# Patient Record
Sex: MALE | Race: ASIAN | NOT HISPANIC OR LATINO
[De-identification: names, ages, dates, MRNs, and addresses within clinical notes are randomized per-mention and may not be internally consistent; named-entity substitution may affect disease eponyms.]

---

## 2022-05-20 ENCOUNTER — APPOINTMENT (OUTPATIENT)
Dept: UROLOGY | Facility: CLINIC | Age: 87
End: 2022-05-20
Payer: MEDICARE

## 2022-05-20 VITALS
OXYGEN SATURATION: 96 % | TEMPERATURE: 97.7 F | HEIGHT: 64 IN | SYSTOLIC BLOOD PRESSURE: 106 MMHG | DIASTOLIC BLOOD PRESSURE: 70 MMHG | BODY MASS INDEX: 28.17 KG/M2 | WEIGHT: 165 LBS | HEART RATE: 93 BPM

## 2022-05-20 DIAGNOSIS — Z78.9 OTHER SPECIFIED HEALTH STATUS: ICD-10-CM

## 2022-05-20 PROCEDURE — 51798 US URINE CAPACITY MEASURE: CPT

## 2022-05-20 PROCEDURE — 51700 IRRIGATION OF BLADDER: CPT

## 2022-05-20 PROCEDURE — 99214 OFFICE O/P EST MOD 30 MIN: CPT | Mod: 25

## 2022-05-20 PROCEDURE — A4218: CPT | Mod: NC

## 2022-05-20 RX ORDER — ERGOCALCIFEROL 1.25 MG/1
1.25 MG CAPSULE, LIQUID FILLED ORAL
Qty: 12 | Refills: 0 | Status: ACTIVE | COMMUNITY
Start: 2022-02-17

## 2022-05-20 RX ORDER — QUETIAPINE FUMARATE 100 MG/1
100 TABLET ORAL
Qty: 30 | Refills: 0 | Status: ACTIVE | COMMUNITY
Start: 2022-03-04

## 2022-05-24 ENCOUNTER — APPOINTMENT (OUTPATIENT)
Dept: UROLOGY | Facility: CLINIC | Age: 87
End: 2022-05-24
Payer: MEDICARE

## 2022-05-24 ENCOUNTER — NON-APPOINTMENT (OUTPATIENT)
Age: 87
End: 2022-05-24

## 2022-05-24 ENCOUNTER — APPOINTMENT (OUTPATIENT)
Dept: CARDIOLOGY | Facility: CLINIC | Age: 87
End: 2022-05-24
Payer: MEDICARE

## 2022-05-24 VITALS
SYSTOLIC BLOOD PRESSURE: 103 MMHG | OXYGEN SATURATION: 94 % | BODY MASS INDEX: 28.84 KG/M2 | HEART RATE: 63 BPM | TEMPERATURE: 97.4 F | WEIGHT: 168 LBS | RESPIRATION RATE: 16 BRPM | DIASTOLIC BLOOD PRESSURE: 69 MMHG

## 2022-05-24 DIAGNOSIS — R33.9 RETENTION OF URINE, UNSPECIFIED: ICD-10-CM

## 2022-05-24 DIAGNOSIS — Z87.11 PERSONAL HISTORY OF PEPTIC ULCER DISEASE: ICD-10-CM

## 2022-05-24 PROCEDURE — 51798 US URINE CAPACITY MEASURE: CPT

## 2022-05-24 PROCEDURE — 93000 ELECTROCARDIOGRAM COMPLETE: CPT | Mod: 59

## 2022-05-24 PROCEDURE — 51700 IRRIGATION OF BLADDER: CPT

## 2022-05-24 PROCEDURE — A4218: CPT | Mod: NC

## 2022-05-24 PROCEDURE — 99213 OFFICE O/P EST LOW 20 MIN: CPT | Mod: 25

## 2022-05-24 PROCEDURE — 99204 OFFICE O/P NEW MOD 45 MIN: CPT | Mod: 25

## 2022-05-24 RX ORDER — CIPROFLOXACIN HYDROCHLORIDE 500 MG/1
500 TABLET, FILM COATED ORAL
Refills: 0 | Status: COMPLETED | OUTPATIENT
Start: 2022-05-24

## 2022-05-24 RX ORDER — AZITHROMYCIN 250 MG/1
250 TABLET, FILM COATED ORAL
Qty: 6 | Refills: 0 | Status: DISCONTINUED | COMMUNITY
Start: 2022-05-05 | End: 2022-05-24

## 2022-05-24 RX ORDER — OMEPRAZOLE 40 MG/1
40 CAPSULE, DELAYED RELEASE ORAL
Qty: 30 | Refills: 0 | Status: DISCONTINUED | COMMUNITY
Start: 2022-03-15 | End: 2022-05-24

## 2022-05-24 RX ORDER — APIXABAN 2.5 MG/1
2.5 TABLET, FILM COATED ORAL
Qty: 60 | Refills: 2 | Status: ACTIVE | COMMUNITY
Start: 2022-05-24 | End: 1900-01-01

## 2022-05-24 RX ORDER — DEXTROMETHORPHAN HBR, GUAIFENESIN 20; 200 MG/10ML; MG/10ML
10-100 SOLUTION ORAL
Qty: 237 | Refills: 0 | Status: DISCONTINUED | COMMUNITY
Start: 2021-12-27 | End: 2022-05-24

## 2022-05-24 RX ORDER — CICLOPIROX OLAMINE 7.7 MG/ML
0.77 SUSPENSION TOPICAL
Qty: 30 | Refills: 0 | Status: DISCONTINUED | COMMUNITY
Start: 2021-09-09 | End: 2022-05-24

## 2022-05-24 RX ORDER — CELECOXIB 200 MG/1
200 CAPSULE ORAL
Qty: 30 | Refills: 0 | Status: DISCONTINUED | COMMUNITY
Start: 2022-02-17 | End: 2022-05-24

## 2022-05-24 RX ORDER — BENZONATATE 100 MG/1
100 CAPSULE ORAL
Qty: 60 | Refills: 0 | Status: DISCONTINUED | COMMUNITY
Start: 2022-02-17 | End: 2022-05-24

## 2022-05-24 RX ORDER — AMMONIUM LACTATE 12 %
12 CREAM (GRAM) TOPICAL
Qty: 385 | Refills: 0 | Status: DISCONTINUED | COMMUNITY
Start: 2021-12-03 | End: 2022-05-24

## 2022-05-24 RX ORDER — DONEPEZIL HYDROCHLORIDE 5 MG/1
5 TABLET ORAL
Qty: 30 | Refills: 0 | Status: DISCONTINUED | COMMUNITY
Start: 2022-05-03 | End: 2022-05-24

## 2022-05-24 RX ORDER — ESCITALOPRAM OXALATE 10 MG/1
10 TABLET ORAL
Qty: 30 | Refills: 0 | Status: DISCONTINUED | COMMUNITY
Start: 2022-05-06 | End: 2022-05-24

## 2022-05-24 RX ORDER — VITAMIN B COMPLEX
CAPSULE ORAL
Qty: 60 | Refills: 0 | Status: DISCONTINUED | COMMUNITY
Start: 2022-02-17 | End: 2022-05-24

## 2022-05-24 RX ORDER — ESCITALOPRAM OXALATE 5 MG/1
5 TABLET ORAL
Qty: 30 | Refills: 0 | Status: DISCONTINUED | COMMUNITY
Start: 2022-03-04 | End: 2022-05-24

## 2022-05-24 RX ORDER — APIXABAN 5 MG/1
5 TABLET, FILM COATED ORAL
Qty: 60 | Refills: 0 | Status: DISCONTINUED | COMMUNITY
Start: 2022-05-12 | End: 2022-05-24

## 2022-05-24 RX ORDER — LOSARTAN POTASSIUM 100 MG/1
100 TABLET, FILM COATED ORAL
Qty: 30 | Refills: 0 | Status: DISCONTINUED | COMMUNITY
Start: 2022-02-17 | End: 2022-05-24

## 2022-05-24 RX ORDER — AMLODIPINE BESYLATE 5 MG/1
5 TABLET ORAL
Qty: 90 | Refills: 0 | Status: DISCONTINUED | COMMUNITY
Start: 2021-12-06 | End: 2022-05-24

## 2022-05-24 RX ORDER — ICOSAPENT ETHYL 1000 MG/1
1 CAPSULE ORAL
Qty: 120 | Refills: 0 | Status: DISCONTINUED | COMMUNITY
Start: 2022-02-17 | End: 2022-05-24

## 2022-05-24 RX ORDER — CLINDAMYCIN HYDROCHLORIDE 300 MG/1
300 CAPSULE ORAL
Qty: 21 | Refills: 0 | Status: DISCONTINUED | COMMUNITY
Start: 2022-01-31 | End: 2022-05-24

## 2022-05-24 RX ORDER — CLONAZEPAM 0.5 MG/1
0.5 TABLET ORAL
Qty: 15 | Refills: 0 | Status: DISCONTINUED | COMMUNITY
Start: 2022-03-05 | End: 2022-05-24

## 2022-05-24 RX ORDER — CETIRIZINE HYDROCHLORIDE 10 MG/1
10 TABLET, COATED ORAL
Qty: 30 | Refills: 0 | Status: DISCONTINUED | COMMUNITY
Start: 2022-02-17 | End: 2022-05-24

## 2022-05-24 RX ORDER — CALCIUM CARBONATE/VITAMIN D3 500MG-5MCG
500-200 TABLET ORAL
Qty: 300 | Refills: 0 | Status: DISCONTINUED | COMMUNITY
Start: 2021-12-06 | End: 2022-05-24

## 2022-05-24 RX ORDER — MINERAL OIL, PETROLATUM 425; 573 MG/G; MG/G
OINTMENT OPHTHALMIC
Qty: 4 | Refills: 0 | Status: DISCONTINUED | COMMUNITY
Start: 2022-03-25 | End: 2022-05-24

## 2022-05-24 RX ORDER — DEXTROMETHORPHAN HYDROBROMIDE, GUAIFENESIN 10; 100 MG/5ML; MG/5ML
10-100 LIQUID ORAL
Qty: 240 | Refills: 0 | Status: DISCONTINUED | COMMUNITY
Start: 2022-05-05 | End: 2022-05-24

## 2022-05-24 RX ORDER — FOLIC ACID 1 MG/1
1 TABLET ORAL
Qty: 60 | Refills: 0 | Status: DISCONTINUED | COMMUNITY
Start: 2022-02-17 | End: 2022-05-24

## 2022-05-24 RX ORDER — FUROSEMIDE 20 MG/1
20 TABLET ORAL
Qty: 30 | Refills: 0 | Status: DISCONTINUED | COMMUNITY
Start: 2022-01-08 | End: 2022-05-24

## 2022-05-24 RX ORDER — ACETAMINOPHEN EXTRA STRENGTH 500 MG/1
500 TABLET ORAL
Qty: 100 | Refills: 0 | Status: DISCONTINUED | COMMUNITY
Start: 2021-12-27 | End: 2022-05-24

## 2022-05-24 RX ORDER — COVID-19 ANTIGEN TEST
KIT MISCELLANEOUS
Qty: 2 | Refills: 0 | Status: DISCONTINUED | COMMUNITY
Start: 2022-05-05 | End: 2022-05-24

## 2022-05-24 RX ORDER — MECLIZINE HYDROCHLORIDE 12.5 MG/1
12.5 TABLET ORAL
Qty: 60 | Refills: 0 | Status: DISCONTINUED | COMMUNITY
Start: 2022-02-17 | End: 2022-05-24

## 2022-05-24 RX ORDER — LIDOCAINE 5% 700 MG/1
5 PATCH TOPICAL
Qty: 60 | Refills: 0 | Status: DISCONTINUED | COMMUNITY
Start: 2022-03-16 | End: 2022-05-24

## 2022-05-24 RX ORDER — ERYTHROMYCIN 5 MG/G
5 OINTMENT OPHTHALMIC
Qty: 4 | Refills: 0 | Status: DISCONTINUED | COMMUNITY
Start: 2022-02-17 | End: 2022-05-24

## 2022-05-24 RX ORDER — NEBIVOLOL 2.5 MG/1
2.5 TABLET ORAL
Qty: 30 | Refills: 0 | Status: DISCONTINUED | COMMUNITY
Start: 2022-03-27 | End: 2022-05-24

## 2022-05-24 RX ORDER — ALBUTEROL SULFATE 90 UG/1
108 (90 BASE) INHALANT RESPIRATORY (INHALATION)
Qty: 18 | Refills: 0 | Status: DISCONTINUED | COMMUNITY
Start: 2022-02-17 | End: 2022-05-24

## 2022-05-24 RX ORDER — METOPROLOL TARTRATE 50 MG/1
50 TABLET, FILM COATED ORAL
Qty: 60 | Refills: 0 | Status: DISCONTINUED | COMMUNITY
Start: 2022-05-16 | End: 2022-05-24

## 2022-05-24 RX ORDER — ALENDRONATE SODIUM 70 MG/1
70 TABLET ORAL
Qty: 12 | Refills: 0 | Status: DISCONTINUED | COMMUNITY
Start: 2022-02-25 | End: 2022-05-24

## 2022-05-24 RX ORDER — MULTIVITAMIN
TABLET ORAL
Qty: 90 | Refills: 0 | Status: DISCONTINUED | COMMUNITY
Start: 2022-02-17 | End: 2022-05-24

## 2022-05-24 RX ORDER — TAMSULOSIN HYDROCHLORIDE 0.4 MG/1
0.4 CAPSULE ORAL
Qty: 30 | Refills: 0 | Status: DISCONTINUED | COMMUNITY
Start: 2022-05-16 | End: 2022-05-24

## 2022-05-24 RX ORDER — CHLORHEXIDINE GLUCONATE, 0.12% ORAL RINSE 1.2 MG/ML
0.12 SOLUTION DENTAL
Qty: 946 | Refills: 0 | Status: DISCONTINUED | COMMUNITY
Start: 2022-03-16 | End: 2022-05-24

## 2022-05-24 NOTE — HISTORY OF PRESENT ILLNESS
[FreeTextEntry1] : Patient, an 86 year old  male with recent hospitalization at [facility] for the  urinary disturbance and leg edema, is here for the follow up of cardiac diagnosis with  shortness of breath, edema of legs and atrial fib. His current medication is reviewed and reconciled.\par He has been with medical condition of respiratory difficulty, depression, chronic coughing, urinary problem before entering the hospital for emergency treatment.\par At limited, but ordinary condition, he has no chest pain, but exertional shortness of breath, no dizziness or palpitation.

## 2022-05-24 NOTE — REVIEW OF SYSTEMS
[Fever] : no fever [Headache] : no headache [Chills] : no chills [Feeling Fatigued] : feeling fatigued [Blurry Vision] : no blurred vision [Seeing Double (Diplopia)] : no diplopia [Eye Pain] : no eye pain [Earache] : no earache [Discharge From Ears] : no discharge from the ears [Hearing Loss] : no hearing loss [Mouth Sores] : no mouth sores [Sore Throat] : no sore throat [Sinus Pressure] : no sinus pressure [Tinnitus] : no tinnitus [Vertigo] : no vertigo [SOB] : no shortness of breath [Dyspnea on exertion] : dyspnea during exertion [Chest Discomfort] : no chest discomfort [Lower Ext Edema] : lower extremity edema [Leg Claudication] : no intermittent leg claudication [Palpitations] : no palpitations [Orthopnea] : no orthopnea [PND] : no PND [Syncope] : no syncope [Cough] : no cough [Wheezing] : no wheezing [Coughing Up Blood] : no hemoptysis [Snoring] : no snoring [Abdominal Pain] : no abdominal pain [Nausea] : no nausea [Vomiting] : no vomiting [Heartburn] : no heartburn [Change in Appetite] : no change in appetite [Change In The Stool] : no change in stool [Dysphagia] : no dysphagia [Diarrhea] : diarrhea [Constipation] : no constipation [Blood in stool] : no blood in stoo [Hematuria] : no hematuria [Joint Pain] : no joint pain [Joint Swelling] : no joint swelling [Joint Stiffness] : no joint stiffness [Muscle Cramps] : no muscle cramps [Myalgia] : no myalgia [Rash] : no rash [Itching] : no itching [Change In Color Of Skin] : change in skin color [Skin Lesions] : no skin lesions [Telangiectasias] : no telangiectasias [Dizziness] : no dizziness [Tremor] : no tremor was seen [Numbness (Hypoesthesia)] : no numbness [Convulsions] : no convulsions [Tingling (Paresthesia)] : no tingling [Weakness] : no weakness [Limb Weakness (Paresis)] : no limb weakness (Paresis) [Speech Disturbance] : no speech disturbance [Confusion] : no confusion was observed [Memory Lapses Or Loss] : no memory lapses or loss [Depression] : no depression [Anxiety] : no anxiety [Under Stress] : not under stress [Suicidal] : not suicidal [Easy Bleeding] : no tendency for easy bleeding [Swollen Glands] : no swollen glands [Easy Bruising] : no tendency for easy bruising [Negative] : Heme/Lymph

## 2022-05-24 NOTE — CARDIOLOGY SUMMARY
[de-identified] : 5- ECG with atrial fib. controlled ventricular rate, no significant STTC. [de-identified] : TTE done in  [facility]( 5-)  pending the evaluation.

## 2022-05-24 NOTE — PHYSICAL EXAM
[Well Developed] : well developed [Well Nourished] : well nourished [No Acute Distress] : no acute distress [Normal Conjunctiva] : normal conjunctiva [Normal Venous Pressure] : normal venous pressure [No Carotid Bruit] : no carotid bruit [Normal S1, S2] : normal S1, S2 [No Murmur] : no murmur [No Rub] : no rub [No Gallop] : no gallop [Clear Lung Fields] : clear lung fields [Good Air Entry] : good air entry [No Respiratory Distress] : no respiratory distress  [Soft] : abdomen soft [Non Tender] : non-tender [No Masses/organomegaly] : no masses/organomegaly [Normal Bowel Sounds] : normal bowel sounds [Normal Gait] : normal gait [No Edema] : no edema [No Cyanosis] : no cyanosis [No Clubbing] : no clubbing [No Varicosities] : no varicosities [No Rash] : no rash [No Skin Lesions] : no skin lesions [Moves all extremities] : moves all extremities [No Focal Deficits] : no focal deficits [Normal Speech] : normal speech [Alert and Oriented] : alert and oriented [Normal memory] : normal memory [de-identified] : s/p thyroidectomy. [de-identified] : reduction of breathing sound consistent with mild emphysema. [de-identified] : operational scar at the epigastric region. [de-identified] : weak while walking. [de-identified] : Skin at lower extremities with  discoloration.

## 2022-05-24 NOTE — DISCUSSION/SUMMARY
[Falls Prevention] : falls prevention counseling [Atrial Fibrillation] : atrial fibrillation [Controlled Ventricular Response] : controlled ventricular response [Congestive Heart Failure] : congestive heart failure [Diastolic Heart Failure] : diastolic heart failure [Responding to Treatment] : responding to treatment [Echocardiogram] : echocardiogram [Sodium Restriction] : sodium restriction [Stable] : stable [None] : There are no changes in medication management [Patient] : the patient [Family] : the patient's family [Minutes Spent: ___] : for [unfilled] ~Uminutes [With Me] : with me [___ Month(s)] : in [unfilled] month(s) [FreeTextEntry1] : The age and the degree of  thrombocytopenia are considered. dosage of Eliquis reduced [de-identified] : " with preserved EF" per hospital record. [de-identified] : continue  diuretics, betablocker. [de-identified] : will secure the  TTE result from  hospital. [de-identified] : Per the hospital record. [de-identified] :  symptoms improved.

## 2022-05-25 ENCOUNTER — OUTPATIENT (OUTPATIENT)
Dept: OUTPATIENT SERVICES | Facility: HOSPITAL | Age: 87
LOS: 1 days | End: 2022-05-25
Payer: COMMERCIAL

## 2022-05-25 ENCOUNTER — APPOINTMENT (OUTPATIENT)
Dept: UROLOGY | Facility: CLINIC | Age: 87
End: 2022-05-25
Payer: MEDICAID

## 2022-05-25 PROCEDURE — 51798 US URINE CAPACITY MEASURE: CPT

## 2022-05-25 PROCEDURE — 51702 INSERT TEMP BLADDER CATH: CPT

## 2022-05-25 PROCEDURE — 99214 OFFICE O/P EST MOD 30 MIN: CPT | Mod: 25

## 2022-05-25 RX ORDER — CIPROFLOXACIN HYDROCHLORIDE 500 MG/1
500 TABLET, FILM COATED ORAL
Refills: 0 | Status: COMPLETED | OUTPATIENT
Start: 2022-05-25

## 2022-05-25 RX ADMIN — CIPROFLOXACIN 0 MG: 500 TABLET, FILM COATED ORAL at 00:00

## 2022-05-25 NOTE — LETTER BODY
[FreeTextEntry1] : Analisa Jarrell MD\par [address]\[place] [address]\par [phone]\par [phone]\par \par Dear Dr. Jarrell,\par \par Reason for visit: BPH.  Urine retention.\par \par This is a 86 year year-old man with BPH on Flomax.  Patient passes voiding trial previously.  Patient returns for followup. SInce he was last seen, patient reports inability urinate for the past 12 hours.  Patient quested urgent evaluation today.  Patient reports taking Flomax.  He did not take Proscar.  Patient denies any interval complaints or difficulties. He  denies any dysuria or gross hematuria.Patient denies any changes in health. The past medical history and family history and social history are unchanged. All other review of systems are negative. Patient denies any changes in medications. Medication list was reconciled.\par \par On examination, the patient is in no acute distress. He is alert and oriented follows commands. He has normal mood and affect. He is normocephalic. Oral no thrush. Neck is supple. Respirations are unlabored. His abdomen is soft and nontender. Liver is nonpalpable. Bladder is nonpalpable. No CVA tenderness. Neurologically he is grossly intact. No peripheral edema. Skin without gross abnormality. He has normal male external genitalia. Normal meatus. Bilateral testes are descended intrascrotally and normal to palpation. On rectal examination, there is normal sphincter tone. The prostate is clinically benign without focal induration or nodularity.\par \par PVR was obtained today because of concern for retention of urine.  PVR measured 750 cc. \par \par A new 16 -Belarusian Serrano catheter was placed without difficulty.  The patient was covered with ciprofloxacin.  \par \par Assessment: Recurrent urinary retention\par \par I counseled the patient.  I recommend patient take Flomax and Proscar as directed.  Serrano catheter was replaced today.  I encouraged patient to return for cystoscopy and urodynamic testing.  Risks and alternatives were discussed. I answered the patient questions. The patient will follow-up as directed and will contact me with any questions or concerns. Thank you for the opportunity to participate in the care of this patient. I'll keep you updated on his progress.\par \par Plan: Cystoscopy urodynamics..  Continue Flomax and Proscar.\par \par I spent 30-minutes time today on all issues related to this patient on today date of service including non face to face time. \par

## 2022-05-30 NOTE — LETTER BODY
[FreeTextEntry1] : Analisa Jarrell MD\par [address]\[place] [address]\par [phone]\par [phone]\par \par Dear Dr. Jarrell,\par \par Reason for visit: BPH. Urinary retention.\par \par This is a 86 year year-old gentleman with urinary retention and chronic BPH. Patient returns today for followup and voiding trial. Patient continues to take Flomax twice daily. Patient reports taking the medication regularly without any side effects or difficulties with the medication. Patient denies any urinary retention or hematuria or changes in health. Patient has no pain. The past medical history and family history and social history are unchanged. All other review of systems are negative. Patient denies any changes in medications. Medication list was reconciled.\par \par On examination, the patient is a elderly appearing gentleman in no acute distress. He is alert and oriented follows commands. He has normal mood and affect. He is normocephalic. Oral no thrush. Neck is supple. Respirations are unlabored. His abdomen is soft and nontender. Liver is nonpalpable. Bladder is nonpalpable. No CVA tenderness. Neurologically he is grossly intact. No peripheral edema. Skin without gross abnormality. \par \par Patient was given a voiding trial today. The patient's bladder was filled with 300 cc of water. Patient was able to void spontaneously.\par \par Post-void residual on bladder scan today was 35 cc. \par \par Assessment: BPH, symptoms improved with Flomax. Retention resolved.\par \par I counseled the patient. I renewed his prescription for Flomax today. I also recommended he add Proscar to his regimen. He will follow-up in 1 weeks for evaluation. I encouraged him to continue medication. Risks and alternatives were discussed. I answered the patient questions. The patient will follow-up as directed and will contact me with any questions or concerns. Thank you for the opportunity to participate in the care of this patient. I'll keep you updated on his progress.\par \par Plan: Followup 1 weeks. Flomax. Add Proscar.

## 2022-05-30 NOTE — LETTER BODY
[FreeTextEntry1] : Analisa Jarrell MD\par [address]\[place] [address]\par [phone]\par [phone]\par \par Dear Dr. Jarrell,\par \par Reason for visit: BPH. Urinary retention.\par \par This is a 86 year year-old gentleman with urinary retention and chronic BPH. Patient here today for evaluation for retention. He has been taking Flomax daily previously.  Patient developed urinary retention and required Serrano placement in the emergency room.  He was at the [facility] from May 10 to May 16.  He was admitted for atrial fibrillation and was found to have retention.  He has been started on Eliquis.  Patient reports taking the medication regularly without any side effects or difficulties with the medication. Patient denies any urinary retention or hematuria or changes in health. Patient has no pain. The past medical history and family history and social history are unchanged. All other review of systems are negative. Patient denies any changes in medications. Medication list was reconciled.\par \par On examination, the patient is a elderly appearing gentleman in no acute distress. He is alert and oriented follows commands. He has normal mood and affect. He is normocephalic. Oral no thrush. Neck is supple. Respirations are unlabored. His abdomen is soft and nontender. Liver is nonpalpable. Bladder is nonpalpable. No CVA tenderness. Neurologically he is grossly intact. No peripheral edema. Skin without gross abnormality.  On rectal examination, there is normal sphincter tone. The prostate is clinically benign without focal induration or nodularity.\par \par His Serrano catheter is draining clear urine.\par \par Assessment: BPH.  Urinary tension.\par \par I counseled the patient.  I recommend patient increase Flomax to twice daily.  And return next week for voiding trial.  I renewed his prescription for Flomax today.  I discussed the potential side effects of the medication. I counseled the patient on its use and side effects. If the patient develops any side effects, the patient will discontinue the medication and contact me. Risks and alternatives were discussed. I answered the patient questions. The patient will follow-up as directed and will contact me with any questions or concerns.   Thank you for the opportunity to participate in the care of this patient. I'll keep you updated on his  progress.\par \par Plan: Followup 1 weeks. Flomax twice daily.

## 2022-05-30 NOTE — LETTER BODY
[FreeTextEntry1] : Analisa Jarrell MD\par [address]\[place] [address]\par [phone]\par [phone]\par \par Dear Dr. Jarrell,\par \par Reason for visit: BPH. Urinary retention.\par \par This is a 86 year year-old gentleman with urinary retention and chronic BPH. Patient here today for evaluation for retention. He has been taking Flomax daily previously.  Patient developed urinary retention and required Serrano placement in the emergency room.  He was at the [facility] from May 10 to May 16.  He was admitted for atrial fibrillation and was found to have retention.  He has been started on Eliquis.  Patient reports taking the medication regularly without any side effects or difficulties with the medication. Patient denies any urinary retention or hematuria or changes in health. Patient has no pain. The past medical history and family history and social history are unchanged. All other review of systems are negative. Patient denies any changes in medications. Medication list was reconciled.\par \par On examination, the patient is a elderly appearing gentleman in no acute distress. He is alert and oriented follows commands. He has normal mood and affect. He is normocephalic. Oral no thrush. Neck is supple. Respirations are unlabored. His abdomen is soft and nontender. Liver is nonpalpable. Bladder is nonpalpable. No CVA tenderness. Neurologically he is grossly intact. No peripheral edema. Skin without gross abnormality.  On rectal examination, there is normal sphincter tone. The prostate is clinically benign without focal induration or nodularity.\par \par His Serrano catheter is draining clear urine.\par \par Assessment: BPH.  Urinary tension.\par \par I counseled the patient.  I recommend patient increase Flomax to twice daily.  And return next week for voiding trial.  I renewed his prescription for Flomax today.  I discussed the potential side effects of the medication. I counseled the patient on its use and side effects. If the patient develops any side effects, the patient will discontinue the medication and contact me. Risks and alternatives were discussed. I answered the patient questions. The patient will follow-up as directed and will contact me with any questions or concerns.   Thank you for the opportunity to participate in the care of this patient. I'll keep you updated on his  progress.\par \par Plan: Followup 1 weeks. Flomax twice daily.

## 2022-06-06 ENCOUNTER — OUTPATIENT (OUTPATIENT)
Dept: OUTPATIENT SERVICES | Facility: HOSPITAL | Age: 87
LOS: 1 days | End: 2022-06-06
Payer: COMMERCIAL

## 2022-06-06 ENCOUNTER — APPOINTMENT (OUTPATIENT)
Dept: UROLOGY | Facility: CLINIC | Age: 87
End: 2022-06-06
Payer: MEDICARE

## 2022-06-06 DIAGNOSIS — R35.0 FREQUENCY OF MICTURITION: ICD-10-CM

## 2022-06-06 DIAGNOSIS — R33.9 RETENTION OF URINE, UNSPECIFIED: ICD-10-CM

## 2022-06-06 PROCEDURE — 51798 US URINE CAPACITY MEASURE: CPT

## 2022-06-06 PROCEDURE — 99214 OFFICE O/P EST MOD 30 MIN: CPT | Mod: 25

## 2022-06-06 PROCEDURE — 51700 IRRIGATION OF BLADDER: CPT

## 2022-06-06 RX ORDER — CIPROFLOXACIN HYDROCHLORIDE 500 MG/1
500 TABLET, FILM COATED ORAL
Refills: 0 | Status: COMPLETED | OUTPATIENT
Start: 2022-06-06

## 2022-06-06 RX ADMIN — CIPROFLOXACIN 0 MG: 500 TABLET, FILM COATED ORAL at 00:00

## 2022-06-07 ENCOUNTER — APPOINTMENT (OUTPATIENT)
Dept: UROLOGY | Facility: CLINIC | Age: 87
End: 2022-06-07
Payer: MEDICARE

## 2022-06-07 VITALS
TEMPERATURE: 97.4 F | DIASTOLIC BLOOD PRESSURE: 72 MMHG | WEIGHT: 165.8 LBS | OXYGEN SATURATION: 95 % | SYSTOLIC BLOOD PRESSURE: 102 MMHG | HEART RATE: 86 BPM | RESPIRATION RATE: 17 BRPM | BODY MASS INDEX: 28.46 KG/M2

## 2022-06-07 DIAGNOSIS — F41.8 OTHER SPECIFIED ANXIETY DISORDERS: ICD-10-CM

## 2022-06-07 DIAGNOSIS — Z00.00 ENCOUNTER FOR GENERAL ADULT MEDICAL EXAMINATION W/OUT ABNORMAL FINDINGS: ICD-10-CM

## 2022-06-07 PROCEDURE — 51702 INSERT TEMP BLADDER CATH: CPT

## 2022-06-07 PROCEDURE — 99214 OFFICE O/P EST MOD 30 MIN: CPT | Mod: 25

## 2022-06-07 PROCEDURE — 51798 US URINE CAPACITY MEASURE: CPT

## 2022-06-07 NOTE — LETTER BODY
[FreeTextEntry1] : Analisa Jarrell MD\par [address]\[place] [address]\par [phone]\par [phone]\par \par Dear Dr. Jarrell,\par \par Reason for visit: BPH. Urinary retention.\par \par This is a 86 year-old man with BPH and urinary retention. The patient returns today for follow up.  Since he was last seen, the patient currently has a Serrano catheter in place. He reports taking Flomax BID and Proscar regularly without any side effects or difficulties. The patient reports progressive urinary symptoms despite medical therapy. He denies any dysuria or gross hematuria. The patient denies any changes in health. The past medical history and family history and social history are unchanged. All other review of systems are negative. Patient denies any changes in medications. Medication list was reconciled.\par \par On examination, the patient is in no acute distress. He is alert and oriented follows commands. He has normal mood and affect. He is normocephalic. Oral no thrush. Neck is supple. Respirations are unlabored. His abdomen is soft and nontender. Liver is nonpalpable. Bladder is nonpalpable. No CVA tenderness. Neurologically he is grossly intact. No peripheral edema. Skin without gross abnormality. He has normal male external genitalia. Normal meatus. Bilateral testes are descended intrascrotally and normal to palpation. On rectal examination, there is normal sphincter tone. The prostate is clinically benign without focal induration or nodularity.\par \par Patient was given a voiding trial today. The patient's bladder was filled with 300 cc of water. Patient was unable to void spontaneously.\par \par Post-void residual on bladder scan today was 500 cc.\par \par A new 16 Somali Serrano catheter was placed in the standard fashion without difficulty.  The patient was covered with ciprofloxacin. \par \par Assessment: BPH. Urinary retention.\par \par I counseled the patient. He failed his voiding trial today. His PVR today was 500 cc. His Serrano catheter was replaced today.  I recommended the patient undergo urodynamics testing and cystoscopy to evaluate the urinary outlet. I counseled the patient regarding the procedure. The risks and benefits were discussed. Alternatives were given. I answered the patient questions. The patient will take the necessary preparations for the procedure. I also recommended the patient continue taking Flomax BID and Proscar. The patient will follow-up as directed and will contact me with any questions or concerns. Thank you for the opportunity to participate in the care of this patient, I will keep you updated on his progress. \par \par Plan: Urodynamics testing and cystoscopy. Continue Flomax BID and Proscar. Follow up as directed.

## 2022-06-07 NOTE — ADDENDUM
[FreeTextEntry1] : Entered by Kelly Thao, acting as scribe for Dr. Jose Ramon Santoyo.\par \par The documentation recorded by the scribe accurately reflects the service I personally performed and the decisions made by me.

## 2022-06-10 ENCOUNTER — APPOINTMENT (OUTPATIENT)
Dept: CARDIOLOGY | Facility: CLINIC | Age: 87
End: 2022-06-10

## 2022-06-17 ENCOUNTER — APPOINTMENT (OUTPATIENT)
Dept: UROLOGY | Facility: CLINIC | Age: 87
End: 2022-06-17
Payer: MEDICARE

## 2022-06-17 VITALS
DIASTOLIC BLOOD PRESSURE: 66 MMHG | RESPIRATION RATE: 17 BRPM | TEMPERATURE: 97.7 F | WEIGHT: 172 LBS | BODY MASS INDEX: 29.52 KG/M2 | SYSTOLIC BLOOD PRESSURE: 101 MMHG | OXYGEN SATURATION: 94 % | HEART RATE: 81 BPM

## 2022-06-17 DIAGNOSIS — T14.8XXA OTHER INJURY OF UNSPECIFIED BODY REGION, INITIAL ENCOUNTER: ICD-10-CM

## 2022-06-17 PROCEDURE — 99213 OFFICE O/P EST LOW 20 MIN: CPT | Mod: 25

## 2022-06-17 PROCEDURE — 51702 INSERT TEMP BLADDER CATH: CPT

## 2022-06-17 RX ORDER — BACITRACIN 500 [IU]/G
500 OINTMENT TOPICAL TWICE DAILY
Qty: 1 | Refills: 3 | Status: ACTIVE | COMMUNITY
Start: 2022-06-17 | End: 1900-01-01

## 2022-06-17 NOTE — LETTER BODY
[FreeTextEntry1] : Analisa Jarrell MD\par [address]\[place] [address]\par [phone]\par [phone]\par \par Dear Dr. Jarrell,\par \par Reason for visit: BPH. Urinary retention.\par \par This is a 86 year-old man with BPH and urinary retention. The patient returns today for follow up. He is accompanied by his daughter. Since he was last seen, the patient currently has a Serrano catheter in place. His daughter claims that his catheter looks malpositioned and kinked. He reports taking Flomax BID and Proscar regularly without any side effects or difficulties. The patient reports progressive urinary symptoms despite medical therapy. He denies any dysuria or gross hematuria. The patient denies any changes in health. The past medical history and family history and social history are unchanged. All other review of systems are negative. Patient denies any changes in medications. Medication list was reconciled.\par \par On examination, the patient is in no acute distress. He is alert and oriented follows commands. He has normal mood and affect. He is normocephalic. Oral no thrush. Neck is supple. Respirations are unlabored. His abdomen is soft and nontender. Liver is nonpalpable. Bladder is nonpalpable. No CVA tenderness. Neurologically he is grossly intact. No peripheral edema. Skin without gross abnormality. He has normal male external genitalia. Normal meatus. Bilateral testes are descended intrascrotally and normal to palpation. On rectal examination, there is normal sphincter tone. The prostate is clinically benign without focal induration or nodularity. There are small skin abrasions on right inguinal area. \par \par A new 16 Turkish Serrano catheter was placed in the standard fashion without difficulty. The patient was covered with ciprofloxacin. \par \par Assessment: BPH. Urinary retention. Skin abrasions. \par \par I counseled the patient. The patient's Serrano catheter was replaced today. I encouraged the patient improve catheter care. In terms of his BPH, I recommended the patient continue taking Flomax BID and Proscar. I also recommended he proceed with scheduled urodynamics testing and cystoscopy. In terms of his skin abrasions, I discussed with the patient that his skin abrasions are self inflicted by scratching. I recommended the patient begin applying Bacitracin. I discussed the potential side effects of the medication. I counseled the patient on its use and side effects. If the patient develops any side effects, the patient will discontinue the medication and contact me. I encouraged  the patient apply Vaseline to promote moisture. Risks and alternatives were discussed. I answered the patient questions. The patient will follow-up as directed and will contact me with any questions or concerns. Thank you for the opportunity to participate in the care of this patient, I will keep you updated on his progress. \par \par Plan: Urodynamics testing and cystoscopy. Bacitracin cream. Continue Flomax BID and Proscar. Follow up as directed.

## 2022-06-21 ENCOUNTER — APPOINTMENT (OUTPATIENT)
Dept: UROLOGY | Facility: CLINIC | Age: 87
End: 2022-06-21

## 2022-06-21 ENCOUNTER — RESULT CHARGE (OUTPATIENT)
Age: 87
End: 2022-06-21

## 2022-06-21 VITALS
BODY MASS INDEX: 29.52 KG/M2 | WEIGHT: 172 LBS | TEMPERATURE: 97.6 F | SYSTOLIC BLOOD PRESSURE: 123 MMHG | DIASTOLIC BLOOD PRESSURE: 78 MMHG | OXYGEN SATURATION: 95 % | RESPIRATION RATE: 17 BRPM | HEART RATE: 87 BPM

## 2022-06-21 PROCEDURE — A4218: CPT | Mod: NC

## 2022-06-21 PROCEDURE — 51700 IRRIGATION OF BLADDER: CPT

## 2022-06-21 PROCEDURE — 51798 US URINE CAPACITY MEASURE: CPT

## 2022-06-21 RX ORDER — CIPROFLOXACIN HYDROCHLORIDE 500 MG/1
500 TABLET, FILM COATED ORAL
Refills: 0 | Status: COMPLETED | OUTPATIENT
Start: 2022-06-21

## 2022-06-21 RX ADMIN — CIPROFLOXACIN HYDROCHLORIDE 0 MG: 500 TABLET, FILM COATED ORAL at 00:00

## 2022-06-21 NOTE — LETTER BODY
[FreeTextEntry1] : Analisa Jarrell MD\par [address]\[place] [address]\par [phone]\par [phone]\par \par Dear Dr. Jarrell,\par \par Reason for visit: BPH. Urinary retention.\par \par This is a 86 year-old man with BPH and urinary retention. The patient returns today for follow up and voiding trial. He is accompanied by his daughter. Since he was last seen, the patient currently has a Serrano catheter in place. He reports taking Flomax BID and Proscar regularly without any side effects or difficulties. The patient reports progressive urinary symptoms despite medical therapy. He also complains of constipation. He denies any dysuria or gross hematuria. The patient denies any changes in health. The past medical history and family history and social history are unchanged. All other review of systems are negative. Patient denies any changes in medications. Medication list was reconciled.\par \par On examination, the patient is in no acute distress. He is alert and oriented follows commands. He has normal mood and affect. He is normocephalic. Oral no thrush. Neck is supple. Respirations are unlabored. His abdomen is soft and nontender. Liver is nonpalpable. Bladder is nonpalpable. No CVA tenderness. Neurologically he is grossly intact. No peripheral edema. Skin without gross abnormality. He has normal male external genitalia. Normal meatus. Bilateral testes are descended intrascrotally and normal to palpation. On rectal examination, there is normal sphincter tone. The prostate is clinically benign without focal induration or nodularity. There are small skin abrasions on right inguinal area. \par \par Patient was given a voiding trial today. The patient's bladder was filled with 300 cc of water. Patient was able to void spontaneously.\par \par Post-void residual on bladder scan today was 0 cc.\par \par Assessment: BPH. Urinary retention. Skin abrasions.\par \par I counseled the patient. He passed his voiding trial today. His PVR was 0 cc today. In terms of his BPH, I recommended the patient continue taking Flomax BID and Proscar. I also recommended he proceed with scheduled urodynamics testing and cystoscopy. In terms of his skin abrasions, I recommended the patient continue applying Bacitracin regularly. I also encouraged the patient apply Vaseline to promote moisture. Risks and alternatives were discussed. I answered the patient questions. The patient will follow-up as directed and will contact me with any questions or concerns. Thank you for the opportunity to participate in the care of this patient, I will keep you updated on his progress. \par \par Plan: Urodynamics testing and cystoscopy. Bacitracin cream. Continue Flomax BID and Proscar. Follow up as directed. \par \par I spent 30-minutes time today on all issues related to this patient on today date of service including non face to face time.

## 2022-06-22 ENCOUNTER — APPOINTMENT (OUTPATIENT)
Dept: CARDIOLOGY | Facility: CLINIC | Age: 87
End: 2022-06-22
Payer: MEDICARE

## 2022-06-22 ENCOUNTER — NON-APPOINTMENT (OUTPATIENT)
Age: 87
End: 2022-06-22

## 2022-06-22 VITALS
HEART RATE: 88 BPM | OXYGEN SATURATION: 97 % | BODY MASS INDEX: 29.52 KG/M2 | SYSTOLIC BLOOD PRESSURE: 130 MMHG | WEIGHT: 172 LBS | DIASTOLIC BLOOD PRESSURE: 86 MMHG | TEMPERATURE: 97.2 F | RESPIRATION RATE: 18 BRPM

## 2022-06-22 PROCEDURE — 93000 ELECTROCARDIOGRAM COMPLETE: CPT | Mod: 59

## 2022-06-22 PROCEDURE — 99214 OFFICE O/P EST MOD 30 MIN: CPT | Mod: 25

## 2022-06-22 RX ORDER — CITALOPRAM HYDROBROMIDE 10 MG/1
10 TABLET, FILM COATED ORAL DAILY
Refills: 0 | Status: DISCONTINUED | COMMUNITY
Start: 2022-05-24 | End: 2022-06-22

## 2022-06-22 RX ORDER — CIPROFLOXACIN HYDROCHLORIDE 500 MG/1
500 TABLET, FILM COATED ORAL
Qty: 2 | Refills: 0 | Status: DISCONTINUED | COMMUNITY
Start: 2022-05-24 | End: 2022-06-22

## 2022-06-22 NOTE — CARDIOLOGY SUMMARY
[de-identified] : 5- ECG with atrial fib. controlled ventricular rate, no significant STTC. [de-identified] : TTE done in  [facility]( 5-)  pending the evaluation.

## 2022-06-22 NOTE — REASON FOR VISIT
[Symptom and Test Evaluation] : symptom and test evaluation [Cardiac Failure] : cardiac failure [Congenital Heart Disease] : congenital heart disease [Arrhythmia/ECG Abnorrmalities] : arrhythmia/ECG abnormalities

## 2022-06-22 NOTE — REVIEW OF SYSTEMS
[Negative] : Heme/Lymph [Fever] : no fever [Headache] : no headache [Chills] : no chills [Feeling Fatigued] : not feeling fatigued [Blurry Vision] : no blurred vision [Seeing Double (Diplopia)] : no diplopia [Eye Pain] : no eye pain [Earache] : no earache [Discharge From Ears] : no discharge from the ears [Hearing Loss] : no hearing loss [Mouth Sores] : no mouth sores [Sore Throat] : no sore throat [Sinus Pressure] : no sinus pressure [Tinnitus] : no tinnitus [Vertigo] : no vertigo [SOB] : no shortness of breath [Dyspnea on exertion] : not dyspnea during exertion [Chest Discomfort] : no chest discomfort [Lower Ext Edema] : no extremity edema [Leg Claudication] : no intermittent leg claudication [Palpitations] : no palpitations [Orthopnea] : no orthopnea [PND] : no PND [Syncope] : no syncope [Cough] : no cough [Wheezing] : no wheezing [Coughing Up Blood] : no hemoptysis [Snoring] : no snoring [Abdominal Pain] : no abdominal pain [Nausea] : no nausea [Vomiting] : no vomiting [Heartburn] : no heartburn [Change in Appetite] : no change in appetite [Change In The Stool] : no change in stool [Dysphagia] : no dysphagia [Diarrhea] : diarrhea [Constipation] : no constipation [Blood in stool] : no blood in stoo [Hematuria] : no hematuria [Joint Pain] : no joint pain [Joint Swelling] : no joint swelling [Joint Stiffness] : no joint stiffness [Muscle Cramps] : no muscle cramps [Myalgia] : no myalgia [Rash] : no rash [Itching] : no itching [Change In Color Of Skin] : change in skin color [Skin Lesions] : no skin lesions [Telangiectasias] : no telangiectasias [Dizziness] : no dizziness [Tremor] : no tremor was seen [Numbness (Hypoesthesia)] : no numbness [Convulsions] : no convulsions [Tingling (Paresthesia)] : no tingling [Weakness] : no weakness [Limb Weakness (Paresis)] : no limb weakness (Paresis) [Speech Disturbance] : no speech disturbance [Confusion] : no confusion was observed [Memory Lapses Or Loss] : no memory lapses or loss [Depression] : no depression [Anxiety] : no anxiety [Under Stress] : not under stress [Suicidal] : not suicidal [Easy Bleeding] : no tendency for easy bleeding [Swollen Glands] : no swollen glands [Easy Bruising] : no tendency for easy bruising

## 2022-06-22 NOTE — DISCUSSION/SUMMARY
[Falls Prevention] : falls prevention counseling [Atrial Fibrillation] : atrial fibrillation [Controlled Ventricular Response] : controlled ventricular response [Congestive Heart Failure] : congestive heart failure [Diastolic Heart Failure] : diastolic heart failure [Responding to Treatment] : responding to treatment [Echocardiogram] : echocardiogram [Sodium Restriction] : sodium restriction [Stable] : stable [None] : There are no changes in medication management [Patient] : the patient [Family] : the patient's family [Minutes Spent: ___] : for [unfilled] ~Uminutes [With Me] : with me [___ Month(s)] : in [unfilled] month(s) [Persistent Atrial Fibrillation] : persistent atrial fibrillation [de-identified] : " with preserved EF" per hospital record. [de-identified] : continue  diuretics, betablocker. [de-identified] : will secure the  TTE result from  hospital. [de-identified] : Per the hospital record. [de-identified] :  symptoms improved. [FreeTextEntry1] : The improvement of CHF was discussed. medication is adjusted.

## 2022-06-22 NOTE — HISTORY OF PRESENT ILLNESS
[FreeTextEntry1] : Patient, an 86 year old  male with recent hospitalization at [facility] for the  urinary disturbance and leg edema, was here for the follow up of cardiac diagnosis with  shortness of breath, edema of legs and atrial fib. His current medication is reviewed and reconciled.\par He has been with medical condition of respiratory difficulty, depression, chronic coughing, urinary problem before entering the hospital for emergency treatment.\par At limited, but ordinary condition, he has no chest pain, but exertional shortness of breath, no dizziness or palpitation.\par With current medication, he has been stable and feeling of well being. He has no more leg edema,

## 2022-06-22 NOTE — PHYSICAL EXAM
[Well Developed] : well developed [Well Nourished] : well nourished [No Acute Distress] : no acute distress [Normal Conjunctiva] : normal conjunctiva [Normal Venous Pressure] : normal venous pressure [No Carotid Bruit] : no carotid bruit [Normal S1, S2] : normal S1, S2 [No Murmur] : no murmur [No Rub] : no rub [No Gallop] : no gallop [Clear Lung Fields] : clear lung fields [Good Air Entry] : good air entry [No Respiratory Distress] : no respiratory distress  [Soft] : abdomen soft [Non Tender] : non-tender [No Masses/organomegaly] : no masses/organomegaly [Normal Bowel Sounds] : normal bowel sounds [Normal Gait] : normal gait [No Edema] : no edema [No Cyanosis] : no cyanosis [No Clubbing] : no clubbing [No Varicosities] : no varicosities [No Rash] : no rash [No Skin Lesions] : no skin lesions [Moves all extremities] : moves all extremities [No Focal Deficits] : no focal deficits [Normal Speech] : normal speech [Alert and Oriented] : alert and oriented [Normal memory] : normal memory [Normal Radial B/L] : normal radial B/L [de-identified] : s/p thyroidectomy. [de-identified] : reduction of breathing sound consistent with mild emphysema. [de-identified] : operational scar at the epigastric region. [de-identified] : weak while walking. [de-identified] : Skin at lower extremities with  discoloration.

## 2022-06-24 ENCOUNTER — APPOINTMENT (OUTPATIENT)
Dept: UROLOGY | Facility: CLINIC | Age: 87
End: 2022-06-24

## 2022-06-24 VITALS
HEART RATE: 72 BPM | OXYGEN SATURATION: 98 % | DIASTOLIC BLOOD PRESSURE: 86 MMHG | BODY MASS INDEX: 30.3 KG/M2 | WEIGHT: 176.5 LBS | SYSTOLIC BLOOD PRESSURE: 131 MMHG | RESPIRATION RATE: 18 BRPM | TEMPERATURE: 97.3 F

## 2022-06-24 PROCEDURE — 51798 US URINE CAPACITY MEASURE: CPT

## 2022-06-24 PROCEDURE — 99214 OFFICE O/P EST MOD 30 MIN: CPT

## 2022-06-25 NOTE — LETTER BODY
[FreeTextEntry1] : Analisa Jarrell MD\par [address]\[place] [address]\par [phone]\par [phone]\par \par Dear Dr. Jarrell,\par \par Reason for visit: BPH. Urinary retention.\par \par This is a 86 year-old man with BPH and urinary retention. The patient returns today for follow up and voiding trial. He is accompanied by his daughter. Since he was last seen, the patient currently has a Serrano catheter in place. He reports taking Flomax BID and Proscar regularly without any side effects or difficulties. The patient reports progressive urinary symptoms despite medical therapy. He also complains of constipation.  Patient requests a voiding trial today.  He denies any dysuria or gross hematuria. The patient denies any changes in health. The past medical history and family history and social history are unchanged. All other review of systems are negative. Patient denies any changes in medications. Medication list was reconciled.\par \par On examination, the patient is in no acute distress. He is alert and oriented follows commands. He has normal mood and affect. He is normocephalic. Oral no thrush. Neck is supple. Respirations are unlabored. His abdomen is soft and nontender. Liver is nonpalpable. Bladder is nonpalpable. No CVA tenderness. Neurologically he is grossly intact. No peripheral edema. Skin without gross abnormality. He has normal male external genitalia. Normal meatus. \par \par Patient was given a voiding trial today. The patient's bladder was filled with 300 cc of water. Patient was able to void spontaneously.\par \par Post-void residual on bladder scan today was 115 cc.  Patient and daughter declines Serrano replacement\par \par Assessment: BPH. Urinary retention, resolved.\par \par I counseled the patient. He passed his voiding trial today. His PVR was 115  cc today. In terms of his BPH, I recommended the patient continue taking Flomax BID and Proscar. I also recommended he proceed with scheduled urodynamics testing and cystoscopy. Risks and alternatives were discussed. I answered the patient questions. The patient will follow-up as directed and will contact me with any questions or concerns. Thank you for the opportunity to participate in the care of this patient, I will keep you updated on his progress. \par \par Plan: Urodynamics testing and cystoscopy.. Continue Flomax BID and Proscar. Follow up as directed. \par \par I spent 30-minutes time today on all issues related to this patient on today date of service including non face to face time. \par \par

## 2022-06-27 DIAGNOSIS — R33.9 RETENTION OF URINE, UNSPECIFIED: ICD-10-CM

## 2022-07-03 NOTE — LETTER BODY
[FreeTextEntry1] : Analisa Jarrell MD\par [address]\[place] [address]\par [phone]\par [phone]\par \par Dear Dr. Jarrell,\par \par Reason for visit: BPH. Urinary retention.\par \par This is a 86 year-old man with BPH and urinary retention. The patient returns today for follow up. He is accompanied by his daughter. Since he was last seen, the patient currently has a Serrano catheter in place. He reports taking Flomax BID and Proscar regularly without any side effects or difficulties. The patient reports improved urinary symptoms on medical therapy. He denies any dysuria or gross hematuria. The patient denies any changes in health. The past medical history and family history and social history are unchanged. All other review of systems are negative. Patient denies any changes in medications. Medication list was reconciled.\par \par On examination, the patient is in no acute distress. He is alert and oriented follows commands. He has normal mood and affect. He is normocephalic. Oral no thrush. Neck is supple. Respirations are unlabored. His abdomen is soft and nontender. Liver is nonpalpable. Bladder is nonpalpable. No CVA tenderness. Neurologically he is grossly intact. No peripheral edema. Skin without gross abnormality. He has normal male external genitalia. Normal meatus. Bilateral testes are descended intrascrotally and normal to palpation. On rectal examination, there is normal sphincter tone. The prostate is clinically benign without focal induration or nodularity. There are small skin abrasions on right inguinal area. \par \par Post-void residual on bladder scan today was 0 cc.\par \par Assessment: BPH. Urinary retention. Skin abrasions.\par \par I counseled the patient. He is doing well. His PVR was 0 cc today. In terms of his BPH, the patient reports improved urinary symptoms on medical therapy. I recommended the patient continue taking Flomax BID and Proscar. In terms of his skin abrasions, I recommended the patient continue applying Bacitracin regularly. I also encouraged the patient apply Vaseline to promote moisture. Risks and alternatives were discussed. I answered the patient questions. The patient will follow-up as directed and will contact me with any questions or concerns. Thank you for the opportunity to participate in the care of this patient, I will keep you updated on his progress. \par \par Plan: Continue Bacitracin cream. Continue Flomax BID and Proscar. Follow up in 6 months.

## 2022-07-04 NOTE — LETTER BODY
[FreeTextEntry1] : Analisa Jarrell MD\par [address]\[place] [address]\par [phone]\par [phone]\par \par Dear Dr. Jarrell,\par \par Reason for visit: BPH. Urinary retention.\par \par This is a 86 year-old man with BPH and urinary retention. The patient returns today for follow up. He is accompanied by his daughter. Since he was last seen, the patient currently has a Serrano catheter in place. He reports taking Flomax BID and Proscar regularly without any side effects or difficulties. The patient reports improved urinary symptoms on medical therapy. He denies any dysuria or gross hematuria. The patient denies any changes in health. The past medical history and family history and social history are unchanged. All other review of systems are negative. Patient denies any changes in medications. Medication list was reconciled.\par \par On examination, the patient is in no acute distress. He is alert and oriented follows commands. He has normal mood and affect. He is normocephalic. Oral no thrush. Neck is supple. Respirations are unlabored. His abdomen is soft and nontender. Liver is nonpalpable. Bladder is nonpalpable. No CVA tenderness. Neurologically he is grossly intact. No peripheral edema. Skin without gross abnormality. He has normal male external genitalia. Normal meatus. Bilateral testes are descended intrascrotally and normal to palpation. On rectal examination, there is normal sphincter tone. The prostate is clinically benign without focal induration or nodularity. There are small skin abrasions on right inguinal area. \par \par Post-void residual on bladder scan today was 0 cc.\par \par Assessment: BPH. Urinary retention. Skin abrasions.\par \par I counseled the patient. He is doing well. His PVR was 0 cc today. In terms of his BPH, the patient reports improved urinary symptoms on medical therapy. I recommended the patient continue taking Flomax BID and Proscar. In terms of his skin abrasions, I recommended the patient continue applying Bacitracin regularly. I also encouraged the patient apply Vaseline to promote moisture. Risks and alternatives were discussed. I answered the patient questions. The patient will follow-up as directed and will contact me with any questions or concerns. Thank you for the opportunity to participate in the care of this patient, I will keep you updated on his progress. \par \par Plan: Continue Bacitracin cream. Continue Flomax BID and Proscar. Follow up in 6 months.

## 2022-07-04 NOTE — HISTORY OF PRESENT ILLNESS
[FreeTextEntry1] : Please refer to URO Consult note \par \par fua bph\par flomax/proscar\par improved\par \par fu retention\par PVR 0\par \par Follow-up in 6 months.

## 2022-08-12 ENCOUNTER — RX RENEWAL (OUTPATIENT)
Age: 87
End: 2022-08-12

## 2022-08-22 ENCOUNTER — APPOINTMENT (OUTPATIENT)
Dept: UROLOGY | Facility: CLINIC | Age: 87
End: 2022-08-22

## 2022-09-09 ENCOUNTER — APPOINTMENT (OUTPATIENT)
Dept: UROLOGY | Facility: CLINIC | Age: 87
End: 2022-09-09

## 2022-09-09 VITALS
SYSTOLIC BLOOD PRESSURE: 155 MMHG | DIASTOLIC BLOOD PRESSURE: 104 MMHG | WEIGHT: 177 LBS | BODY MASS INDEX: 30.38 KG/M2 | RESPIRATION RATE: 18 BRPM | HEART RATE: 72 BPM | TEMPERATURE: 97.2 F | OXYGEN SATURATION: 97 %

## 2022-09-09 DIAGNOSIS — R33.8 BENIGN PROSTATIC HYPERPLASIA WITH LOWER URINARY TRACT SYMPMS: ICD-10-CM

## 2022-09-09 DIAGNOSIS — N40.1 BENIGN PROSTATIC HYPERPLASIA WITH LOWER URINARY TRACT SYMPMS: ICD-10-CM

## 2022-09-09 PROCEDURE — 99214 OFFICE O/P EST MOD 30 MIN: CPT

## 2022-09-09 PROCEDURE — 51798 US URINE CAPACITY MEASURE: CPT

## 2022-09-09 NOTE — LETTER BODY
[FreeTextEntry1] : Analisa Jarrell MD\par [address]\[place] [address]\par [phone]\par [phone]\par \par Dear Dr. Jarrell,\par \par Reason for visit: BPH. Urinary retention.\par \par This is a 86 year-old man with BPH and urinary retention. The patient returns today for follow up. He is accompanied by his daughter. Since he was last seen, the patient is doing well. He reports taking Flomax BID and Proscar regularly without any side effects or difficulties. The patient reports improved urinary symptoms on medical therapy. He denies any dysuria or gross hematuria. The patient denies any changes in health. The past medical history and family history and social history are unchanged. All other review of systems are negative. Patient denies any changes in medications. Medication list was reconciled.\par \par On examination, the patient is in no acute distress. He is alert and oriented follows commands. He has normal mood and affect. He is normocephalic. Oral no thrush. Neck is supple. Respirations are unlabored. His abdomen is soft and nontender. Liver is nonpalpable. Bladder is nonpalpable. No CVA tenderness. Neurologically he is grossly intact. No peripheral edema. Skin without gross abnormality. He has normal male external genitalia. Normal meatus. Bilateral testes are descended intrascrotally and normal to palpation. On rectal examination, there is normal sphincter tone. The prostate is clinically benign without focal induration or nodularity.\par \par Post-void residual on bladder scan today was 0 cc.\par \par Assessment: BPH. Urinary retention.\par \par I counseled the patient. He is doing well. His PVR was 0 cc today. In terms of his BPH, the patient reports improved urinary symptoms on medical therapy. I recommended the patient continue taking Flomax BID and Proscar. I renewed the patient's prescription for Flomax and Proscar today. I encouraged the patient to continue medications regularly as directed. Risks and alternatives were discussed. I answered the patient questions. The patient will follow-up as directed and will contact me with any questions or concerns. Thank you for the opportunity to participate in the care of this patient, I will keep you updated on his progress. \par \par Plan: Continue Flomax BID and Proscar. Follow up in 6 months.

## 2022-09-09 NOTE — ADDENDUM
[FreeTextEntry1] : Entered by PABLO CRONIN, acting as scribe for Dr. Jose Ramon Santoyo.\par The documentation recorded by the scribe accurately reflects the service I personally performed and the decisions made by me.

## 2022-09-11 LAB
APPEARANCE: CLEAR
BACTERIA: NEGATIVE
BILIRUBIN URINE: NEGATIVE
BLOOD URINE: NEGATIVE
COLOR: YELLOW
GLUCOSE QUALITATIVE U: NEGATIVE
HYALINE CASTS: 0 /LPF
KETONES URINE: NEGATIVE
LEUKOCYTE ESTERASE URINE: NEGATIVE
MICROSCOPIC-UA: NORMAL
NITRITE URINE: NEGATIVE
PH URINE: 6.5
PROTEIN URINE: ABNORMAL
RED BLOOD CELLS URINE: 0 /HPF
SPECIFIC GRAVITY URINE: 1.02
SQUAMOUS EPITHELIAL CELLS: 1 /HPF
UROBILINOGEN URINE: NORMAL
WHITE BLOOD CELLS URINE: 4 /HPF

## 2022-10-10 ENCOUNTER — APPOINTMENT (OUTPATIENT)
Dept: CARDIOLOGY | Facility: CLINIC | Age: 87
End: 2022-10-10

## 2022-10-10 ENCOUNTER — NON-APPOINTMENT (OUTPATIENT)
Age: 87
End: 2022-10-10

## 2022-10-10 VITALS
BODY MASS INDEX: 31.24 KG/M2 | OXYGEN SATURATION: 97 % | TEMPERATURE: 97.7 F | WEIGHT: 182 LBS | HEART RATE: 73 BPM | DIASTOLIC BLOOD PRESSURE: 90 MMHG | SYSTOLIC BLOOD PRESSURE: 146 MMHG | RESPIRATION RATE: 18 BRPM

## 2022-10-10 PROCEDURE — 93306 TTE W/DOPPLER COMPLETE: CPT

## 2022-10-10 PROCEDURE — 99214 OFFICE O/P EST MOD 30 MIN: CPT | Mod: 25

## 2022-10-10 PROCEDURE — 93000 ELECTROCARDIOGRAM COMPLETE: CPT | Mod: 59

## 2022-10-10 RX ORDER — SPIRONOLACTONE 25 MG/1
25 TABLET ORAL
Qty: 30 | Refills: 1 | Status: ACTIVE | COMMUNITY
Start: 2022-10-10 | End: 1900-01-01

## 2022-10-10 RX ORDER — FUROSEMIDE 40 MG/1
40 TABLET ORAL
Qty: 30 | Refills: 1 | Status: ACTIVE | COMMUNITY
Start: 2022-05-16 | End: 1900-01-01

## 2022-10-10 NOTE — DISCUSSION/SUMMARY
[Falls Prevention] : falls prevention counseling [Atrial Fibrillation] : atrial fibrillation [Persistent Atrial Fibrillation] : persistent atrial fibrillation [Controlled Ventricular Response] : controlled ventricular response [Congestive Heart Failure] : congestive heart failure [Diastolic Heart Failure] : diastolic heart failure [Responding to Treatment] : responding to treatment [Echocardiogram] : echocardiogram [Sodium Restriction] : sodium restriction [Stable] : stable [None] : There are no changes in medication management [Patient] : the patient [Family] : the patient's family [Minutes Spent: ___] : for [unfilled] ~Uminutes [With Me] : with me [___ Month(s)] : in [unfilled] month(s) [de-identified] : " with preserved EF" per hospital record. [de-identified] : continue  diuretics, betablocker. adding aldactone [de-identified] : Per the hospital record. [de-identified] :  symptoms improved. [FreeTextEntry1] : The improvement of CHF was discussed. medication is adjusted.

## 2022-10-10 NOTE — CARDIOLOGY SUMMARY
[de-identified] : 5- ECG with atrial fib. controlled ventricular rate, no significant STTC. [de-identified] : TTE done in  [facility]( 5-)  pending the evaluation. repeated echo on 10-, result with mild reduction of EF and  pulmonary hypertension (55).

## 2022-10-10 NOTE — HISTORY OF PRESENT ILLNESS
[FreeTextEntry1] : Patient, an 86 year old  male with recent hospitalization at [facility] for the  urinary disturbance and leg edema, was here for the follow up of cardiac diagnosis with  shortness of breath, edema of legs and atrial fib. His current medication is reviewed and reconciled.\par He has been with medical condition of respiratory difficulty, depression, chronic coughing, urinary problem before entering the hospital for emergency treatment.\par At limited, but ordinary condition, he has no chest pain, but exertional shortness of breath, no dizziness or palpitation.\par With current medication, he has been stable and feeling of well being. He is here with the findings of progressive worsening of the feet edema.

## 2022-10-10 NOTE — REVIEW OF SYSTEMS
[Negative] : Heme/Lymph [Fever] : no fever [Headache] : no headache [Chills] : no chills [Feeling Fatigued] : not feeling fatigued [Blurry Vision] : no blurred vision [Seeing Double (Diplopia)] : no diplopia [Eye Pain] : no eye pain [Earache] : no earache [Discharge From Ears] : no discharge from the ears [Hearing Loss] : no hearing loss [Mouth Sores] : no mouth sores [Sore Throat] : no sore throat [Sinus Pressure] : no sinus pressure [Tinnitus] : no tinnitus [Vertigo] : no vertigo [SOB] : no shortness of breath [Dyspnea on exertion] : not dyspnea during exertion [Chest Discomfort] : no chest discomfort [Lower Ext Edema] : lower extremity edema [Leg Claudication] : no intermittent leg claudication [Palpitations] : no palpitations [Orthopnea] : no orthopnea [PND] : no PND [Syncope] : no syncope [Cough] : no cough [Wheezing] : no wheezing [Coughing Up Blood] : no hemoptysis [Snoring] : no snoring [Abdominal Pain] : no abdominal pain [Nausea] : no nausea [Vomiting] : no vomiting [Heartburn] : no heartburn [Change in Appetite] : no change in appetite [Change In The Stool] : no change in stool [Dysphagia] : no dysphagia [Diarrhea] : diarrhea [Constipation] : no constipation [Blood in stool] : no blood in stoo [Hematuria] : no hematuria [Joint Pain] : no joint pain [Joint Swelling] : no joint swelling [Joint Stiffness] : no joint stiffness [Muscle Cramps] : no muscle cramps [Myalgia] : no myalgia [Rash] : no rash [Itching] : no itching [Change In Color Of Skin] : change in skin color [Skin Lesions] : no skin lesions [Telangiectasias] : no telangiectasias [Dizziness] : no dizziness [Tremor] : no tremor was seen [Numbness (Hypoesthesia)] : no numbness [Convulsions] : no convulsions [Tingling (Paresthesia)] : no tingling [Weakness] : no weakness [Limb Weakness (Paresis)] : no limb weakness (Paresis) [Speech Disturbance] : no speech disturbance [Confusion] : no confusion was observed [Memory Lapses Or Loss] : no memory lapses or loss [Depression] : no depression [Anxiety] : no anxiety [Under Stress] : not under stress [Suicidal] : not suicidal [Easy Bleeding] : no tendency for easy bleeding [Swollen Glands] : no swollen glands [Easy Bruising] : no tendency for easy bruising

## 2022-10-10 NOTE — PHYSICAL EXAM
[Well Developed] : well developed [Well Nourished] : well nourished [No Acute Distress] : no acute distress [Normal Conjunctiva] : normal conjunctiva [Normal Venous Pressure] : normal venous pressure [No Carotid Bruit] : no carotid bruit [Normal S1, S2] : normal S1, S2 [No Murmur] : no murmur [No Rub] : no rub [No Gallop] : no gallop [Clear Lung Fields] : clear lung fields [Good Air Entry] : good air entry [No Respiratory Distress] : no respiratory distress  [Soft] : abdomen soft [Non Tender] : non-tender [No Masses/organomegaly] : no masses/organomegaly [Normal Bowel Sounds] : normal bowel sounds [Normal Gait] : normal gait [No Edema] : no edema [No Cyanosis] : no cyanosis [No Clubbing] : no clubbing [No Varicosities] : no varicosities [Normal Radial B/L] : normal radial B/L [No Rash] : no rash [No Skin Lesions] : no skin lesions [Moves all extremities] : moves all extremities [No Focal Deficits] : no focal deficits [Normal Speech] : normal speech [Alert and Oriented] : alert and oriented [Normal memory] : normal memory [de-identified] : s/p thyroidectomy. [de-identified] : reduction of breathing sound consistent with mild emphysema. [de-identified] : operational scar at the epigastric region. [de-identified] : weak while walking. [de-identified] : LE with varicose vein, pedal edema. [de-identified] : Skin at lower extremities with  discoloration.

## 2022-10-10 NOTE — REASON FOR VISIT
[Symptom and Test Evaluation] : symptom and test evaluation [Congenital Heart Disease] : congenital heart disease [Cardiac Failure] : cardiac failure [Arrhythmia/ECG Abnorrmalities] : arrhythmia/ECG abnormalities

## 2022-10-12 LAB
ALBUMIN SERPL ELPH-MCNC: 3.7 G/DL
ALP BLD-CCNC: 137 U/L
ALT SERPL-CCNC: 120 U/L
ANION GAP SERPL CALC-SCNC: 10 MMOL/L
AST SERPL-CCNC: 164 U/L
BASOPHILS # BLD AUTO: 0.02 K/UL
BASOPHILS NFR BLD AUTO: 0.4 %
BILIRUB SERPL-MCNC: 1.3 MG/DL
BUN SERPL-MCNC: 18 MG/DL
CALCIUM SERPL-MCNC: 8.7 MG/DL
CHLORIDE SERPL-SCNC: 107 MMOL/L
CHOLEST SERPL-MCNC: 108 MG/DL
CO2 SERPL-SCNC: 27 MMOL/L
CREAT SERPL-MCNC: 0.96 MG/DL
EGFR: 78 ML/MIN/1.73M2
EOSINOPHIL # BLD AUTO: 0.06 K/UL
EOSINOPHIL NFR BLD AUTO: 1.3 %
GLUCOSE SERPL-MCNC: 94 MG/DL
HCT VFR BLD CALC: 41.8 %
HDLC SERPL-MCNC: 55 MG/DL
HGB BLD-MCNC: 13.4 G/DL
IMM GRANULOCYTES NFR BLD AUTO: 0.2 %
LDLC SERPL CALC-MCNC: 41 MG/DL
LYMPHOCYTES # BLD AUTO: 1.8 K/UL
LYMPHOCYTES NFR BLD AUTO: 40 %
MAN DIFF?: NORMAL
MCHC RBC-ENTMCNC: 22.7 PG
MCHC RBC-ENTMCNC: 32.1 GM/DL
MCV RBC AUTO: 70.7 FL
MONOCYTES # BLD AUTO: 0.78 K/UL
MONOCYTES NFR BLD AUTO: 17.3 %
NEUTROPHILS # BLD AUTO: 1.83 K/UL
NEUTROPHILS NFR BLD AUTO: 40.8 %
NONHDLC SERPL-MCNC: 53 MG/DL
PLATELET # BLD AUTO: 59 K/UL
POTASSIUM SERPL-SCNC: 4.1 MMOL/L
PROT SERPL-MCNC: 7.5 G/DL
RBC # BLD: 5.91 M/UL
RBC # FLD: 18.1 %
SODIUM SERPL-SCNC: 144 MMOL/L
TRIGL SERPL-MCNC: 60 MG/DL
WBC # FLD AUTO: 4.5 K/UL

## 2022-12-21 NOTE — REASON FOR VISIT
[Symptom and Test Evaluation] : symptom and test evaluation [Cardiac Failure] : cardiac failure [Congenital Heart Disease] : congenital heart disease [Arrhythmia/ECG Abnorrmalities] : arrhythmia/ECG abnormalities Hemigard Intro: Due to skin fragility and wound tension, it was decided to use HEMIGARD adhesive retention suture devices to permit a linear closure. The skin was cleaned and dried for a 6cm distance away from the wound. Excessive hair, if present, was removed to allow for adhesion.

## 2022-12-29 ENCOUNTER — EMERGENCY (EMERGENCY)
Facility: HOSPITAL | Age: 87
LOS: 1 days | Discharge: HOME | End: 2022-12-29
Attending: EMERGENCY MEDICINE | Admitting: EMERGENCY MEDICINE
Payer: MEDICARE

## 2022-12-29 VITALS
DIASTOLIC BLOOD PRESSURE: 113 MMHG | RESPIRATION RATE: 16 BRPM | SYSTOLIC BLOOD PRESSURE: 177 MMHG | OXYGEN SATURATION: 96 % | TEMPERATURE: 98 F | HEART RATE: 89 BPM

## 2022-12-29 VITALS
HEART RATE: 79 BPM | RESPIRATION RATE: 18 BRPM | WEIGHT: 162.92 LBS | DIASTOLIC BLOOD PRESSURE: 91 MMHG | OXYGEN SATURATION: 93 % | SYSTOLIC BLOOD PRESSURE: 134 MMHG | TEMPERATURE: 98 F

## 2022-12-29 LAB
ALBUMIN SERPL ELPH-MCNC: 1.9 G/DL — LOW (ref 3.4–5)
ALP SERPL-CCNC: 129 U/L — HIGH (ref 40–120)
ALT FLD-CCNC: 50 U/L — HIGH (ref 12–42)
ANION GAP SERPL CALC-SCNC: 6 MMOL/L — LOW (ref 9–16)
ANISOCYTOSIS BLD QL: SLIGHT — SIGNIFICANT CHANGE UP
APTT BLD: 27.2 SEC — LOW (ref 27.5–35.5)
AST SERPL-CCNC: 56 U/L — HIGH (ref 15–37)
BASOPHILS # BLD AUTO: 0 K/UL — SIGNIFICANT CHANGE UP (ref 0–0.2)
BASOPHILS NFR BLD AUTO: 0 % — SIGNIFICANT CHANGE UP (ref 0–2)
BILIRUB SERPL-MCNC: 1 MG/DL — SIGNIFICANT CHANGE UP (ref 0.2–1.2)
BUN SERPL-MCNC: 19 MG/DL — SIGNIFICANT CHANGE UP (ref 7–23)
CALCIUM SERPL-MCNC: 8.2 MG/DL — LOW (ref 8.5–10.5)
CHLORIDE SERPL-SCNC: 107 MMOL/L — SIGNIFICANT CHANGE UP (ref 96–108)
CO2 SERPL-SCNC: 25 MMOL/L — SIGNIFICANT CHANGE UP (ref 22–31)
CREAT SERPL-MCNC: 0.91 MG/DL — SIGNIFICANT CHANGE UP (ref 0.5–1.3)
EGFR: 83 ML/MIN/1.73M2 — SIGNIFICANT CHANGE UP
EOSINOPHIL # BLD AUTO: 0 K/UL — SIGNIFICANT CHANGE UP (ref 0–0.5)
EOSINOPHIL NFR BLD AUTO: 0 % — SIGNIFICANT CHANGE UP (ref 0–6)
GIANT PLATELETS BLD QL SMEAR: PRESENT — SIGNIFICANT CHANGE UP
GLUCOSE SERPL-MCNC: 121 MG/DL — HIGH (ref 70–99)
HCT VFR BLD CALC: 41.7 % — SIGNIFICANT CHANGE UP (ref 39–50)
HGB BLD-MCNC: 13.2 G/DL — SIGNIFICANT CHANGE UP (ref 13–17)
INR BLD: 1.14 — SIGNIFICANT CHANGE UP (ref 0.88–1.16)
LYMPHOCYTES # BLD AUTO: 1.23 K/UL — SIGNIFICANT CHANGE UP (ref 1–3.3)
LYMPHOCYTES # BLD AUTO: 20 % — SIGNIFICANT CHANGE UP (ref 13–44)
MACROCYTES BLD QL: SLIGHT — SIGNIFICANT CHANGE UP
MANUAL SMEAR VERIFICATION: SIGNIFICANT CHANGE UP
MCHC RBC-ENTMCNC: 22.8 PG — LOW (ref 27–34)
MCHC RBC-ENTMCNC: 31.7 GM/DL — LOW (ref 32–36)
MCV RBC AUTO: 72 FL — LOW (ref 80–100)
MONOCYTES # BLD AUTO: 1.04 K/UL — HIGH (ref 0–0.9)
MONOCYTES NFR BLD AUTO: 17 % — HIGH (ref 2–14)
MYELOCYTES NFR BLD: 1 % — HIGH (ref 0–0)
NEUTROPHILS # BLD AUTO: 3.8 K/UL — SIGNIFICANT CHANGE UP (ref 1.8–7.4)
NEUTROPHILS NFR BLD AUTO: 61 % — SIGNIFICANT CHANGE UP (ref 43–77)
NEUTS BAND # BLD: 1 % — SIGNIFICANT CHANGE UP (ref 0–8)
NRBC # BLD: 0 /100 — SIGNIFICANT CHANGE UP (ref 0–0)
NRBC # BLD: SIGNIFICANT CHANGE UP /100 WBCS (ref 0–0)
PLAT MORPH BLD: ABNORMAL
PLATELET # BLD AUTO: 99 K/UL — LOW (ref 150–400)
PLATELET COUNT - ESTIMATE: ABNORMAL
POIKILOCYTOSIS BLD QL AUTO: SLIGHT — SIGNIFICANT CHANGE UP
POTASSIUM SERPL-MCNC: 3.9 MMOL/L — SIGNIFICANT CHANGE UP (ref 3.5–5.3)
POTASSIUM SERPL-SCNC: 3.9 MMOL/L — SIGNIFICANT CHANGE UP (ref 3.5–5.3)
PROT SERPL-MCNC: 6.7 G/DL — SIGNIFICANT CHANGE UP (ref 6.4–8.2)
PROTHROM AB SERPL-ACNC: 13.2 SEC — SIGNIFICANT CHANGE UP (ref 10.5–13.4)
RBC # BLD: 5.79 M/UL — SIGNIFICANT CHANGE UP (ref 4.2–5.8)
RBC # FLD: 19.7 % — HIGH (ref 10.3–14.5)
RBC BLD AUTO: ABNORMAL
SARS-COV-2 RNA SPEC QL NAA+PROBE: DETECTED
SODIUM SERPL-SCNC: 138 MMOL/L — SIGNIFICANT CHANGE UP (ref 132–145)
TARGETS BLD QL SMEAR: SLIGHT — SIGNIFICANT CHANGE UP
WBC # BLD: 6.13 K/UL — SIGNIFICANT CHANGE UP (ref 3.8–10.5)
WBC # FLD AUTO: 6.13 K/UL — SIGNIFICANT CHANGE UP (ref 3.8–10.5)

## 2022-12-29 PROCEDURE — 99284 EMERGENCY DEPT VISIT MOD MDM: CPT

## 2022-12-29 PROCEDURE — 70450 CT HEAD/BRAIN W/O DYE: CPT | Mod: 26

## 2022-12-29 PROCEDURE — 70486 CT MAXILLOFACIAL W/O DYE: CPT | Mod: 26

## 2022-12-29 RX ORDER — APIXABAN 2.5 MG/1
1 TABLET, FILM COATED ORAL
Qty: 60 | Refills: 0
Start: 2022-12-29 | End: 2023-01-27

## 2022-12-29 NOTE — ED PROVIDER NOTE - PATIENT PORTAL LINK FT
You can access the FollowMyHealth Patient Portal offered by [facility] by registering at the following website: [email or web address]. By joining [employer]’s FollowMyHealth portal, you will also be able to view your health information using other applications (apps) compatible with our system. You can access the FollowMyHealth Patient Portal offered by [facility] by registering at the following website: [email or web address]. By joining [employer]’s FollowMyHealth portal, you will also be able to view your health information using other applications (apps) compatible with our system. You can access the FollowMyHealth Patient Portal offered by [facility] by registering at the following website: [email or web address]. By joining [employer]’s FollowMyHealth portal, you will also be able to view your health information using other applications (apps) compatible with our system.

## 2022-12-29 NOTE — ED PROVIDER NOTE - NSICDXPASTMEDICALHX_GEN_ALL_CORE_FT
PAST MEDICAL HISTORY:  BPH (benign prostatic hyperplasia)     HTN (hypertension)      PAST MEDICAL HISTORY:  Afib     BPH (benign prostatic hyperplasia)     Heart failure     HTN (hypertension)

## 2022-12-29 NOTE — ED ADULT NURSE NOTE - NSIMPLEMENTINTERV_GEN_ALL_ED
Implemented All Universal Safety Interventions:  [place] to call system. Call bell, personal items and telephone within reach. Instruct patient to call for assistance. Room bathroom lighting operational. Non-slip footwear when patient is off stretcher. Physically safe environment: no spills, clutter or unnecessary equipment. Stretcher in lowest position, wheels locked, appropriate side rails in place. Implemented All Universal Safety Interventions:  [place] to call system. Call bell, personal items and telephone within reach. Instruct patient to call for assistance. Room bathroom lighting operational. Non-slip footwear when patient is off stretcher. Physically safe environment: no spills, clutter or unnecessary equipment. Stretcher in lowest position, wheels locked, appropriate side rails in place. Implemented All Universal Safety Interventions:  [place] to call system. Call bell, personal items and telephone within reach. Instruct patient to call for assistance. Room bathroom lighting operational. Non-slip footwear when patient is off stretcher. Physically safe environment: no spills, clutter or unnecessary equipment. Stretcher in lowest position, wheels locked, appropriate side rails in place.

## 2022-12-29 NOTE — ED PROVIDER NOTE - CARE PROVIDERS DIRECT ADDRESSES
[email or web address]

## 2022-12-29 NOTE — ED ADULT NURSE REASSESSMENT NOTE - NS ED NURSE REASSESS COMMENT FT1
pt awaiting rpt head CT at 6pm to re-evaluate subdural hematomas. pt awaiting lac repair by MD Dos Santos. daughter at bedside. in no apparent distress. has no complaints at this time. pt awaiting rpt head CT at 6pm to re-evaluate subdural hematomas. pt awaiting lac repair by MD Dos Santos. daughter at bedside. in no apparent distress. has no complaints at this time. denies nausea and headache

## 2022-12-29 NOTE — ED ADULT NURSE REASSESSMENT NOTE - NS ED NURSE REASSESS COMMENT FT1
Transfer of care acknowledged with bedside rounding, lab results reviewed, will continue to monitor.   pt awake, alert, in nad  pt's daughter reports pt is really hungry and asking if pt can eat  md made aware

## 2022-12-29 NOTE — ED ADULT NURSE NOTE - PAIN RATING/NUMBER SCALE (0-10): ACTIVITY
no rashes , no suspicious lesions , no areas of discoloration , no jaundice present , good turgor , no masses ,  3
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
## 2022-12-29 NOTE — ED PROVIDER NOTE - MUSCULOSKELETAL, MLM
Spine appears normal, range of motion is not limited, no muscle or joint tenderness Mild nonpitting edema to the lower extremities.
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
## 2022-12-29 NOTE — ED PROVIDER NOTE - PROVIDER TOKENS
PROVIDER:[TOKEN:[[id number]],FOLLOWUP:[7-10 Days]] PROVIDER:[TOKEN:[[id number]],FOLLOWUP:[7-10 Days]] PROVIDER:[TOKEN:[[id number]],FOLLOWUP:[7-10 Days]]
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
## 2022-12-29 NOTE — ED PROVIDER NOTE - PROGRESS NOTE DETAILS
[facility], re-assess, dc eliquis for 7 days if stable [facility], re-assess, dc eliquis for 7 days if stable [facility], re-assess, dc eliquis for 7 days if stable

## 2022-12-29 NOTE — ED PROVIDER NOTE - SKIN, MLM
Skin normal color for race, warm, dry and intact. Upper lip with mucosal surface laceration, deep to the outer skin surface, through and through lip laceration. Upper lip with mucosal surface laceration, deep to the muscle, superficial skin upper lip laceration. No a through and through laceration

## 2022-12-29 NOTE — ED PROVIDER NOTE - NSFOLLOWUPINSTRUCTIONS_ED_ALL_ED_FT
EnglishSpanish                                                                                                                                                                                              Subdural Hematoma    Cross-section of the brain with a close-up showing a collection of blood between the brain and its outer covering, or dura.   A subdural hematoma is a collection of blood between the brain and its outer covering (dura). As the amount of blood increases, pressure builds on the brain.    There are two types of subdural hematomas:  •Acute. This type develops shortly after a hard, direct hit to the head and causes blood to collect very quickly. This is a medical emergency. If it is not diagnosed and treated quickly, it can lead to severe brain injury or death.      •Chronic. This is when bleeding develops more slowly, over weeks or months. In some cases, this type does not cause symptoms.        What are the causes?    This condition is caused by bleeding (hemorrhage) from a broken (ruptured) blood vessel. In most cases, a blood vessel ruptures and bleeds because of a head injury, such as from a hard, direct hit. Head injuries can happen in car accidents, falls, assaults, or while playing sports.    In rare cases, a hemorrhage can happen without a known cause (spontaneously), especially if you take blood thinners (anticoagulants).      What increases the risk?    This condition is more likely to develop in:  •Older people.      •Infants.      •People who take blood thinners.      •People who have head injuries.      •People who abuse alcohol.        What are the signs or symptoms?    Symptoms of this condition can vary depending on the size of the hematoma. Symptoms can be mild, severe, or life-threatening. They include:  •Headaches.      •Nausea or vomiting.      •Changes in vision, such as double vision or loss of vision.      •Changes in speech or trouble understanding what people say.      •Loss of balance or trouble walking.      •Weakness, numbness, or tingling in the arms or legs, especially on one side of the body.      •Seizures.      •Change in personality.      •Increased sleepiness.      •Memory loss.      •Loss of consciousness.      •Coma.      Symptoms of acute subdural hematoma can develop over minutes or hours. Symptoms of chronic subdural hematoma may develop over weeks or months.      How is this diagnosed?    This condition is diagnosed based on the results of:  •A physical exam.       •Tests of strength, reflexes, coordination, senses, manner of walking (gait), and facial and eye movements (neurological exam).       •Imaging tests, such as an MRI or a CT scan.        How is this treated?    Treatment for this condition depends on the type of hematoma and how severe it is.    Treatment for acute hematoma may include:  •Emergency surgery to drain blood or remove a blood clot.      •Medicines that help the body get rid of excess fluids (diuretics). These may help to reduce pressure in the brain.      •Assisted breathing (ventilation).      Treatment for chronic hematoma may include:  •Observation and bed rest at the hospital.      •Surgery.      If you take blood thinners, you may need to stop taking them for a short time. You may also be given anti-seizure (anticonvulsant) medicine.    Sometimes, no treatment is needed for chronic subdural hematoma.      Follow these instructions at home:    Activity   •Avoid situations where you could injure your head again, such as in competitive sports, downhill snow sports, and horseback riding. Do not do these activities until your health care provider approves.  •Wear protective gear, such as a helmet, when participating in activities such as biking or contact sports.        •Avoid too much visual stimulation while recovering. This means limiting how much you read and limiting your screen time on a smart phone, tablet, computer, or TV.      •Rest as told by your health care provider. Rest helps the brain heal.      •Try to avoid activities that cause physical or mental stress. Return to work or school as told by your health care provider.      • Do not lift anything that is heavier than 5 lb (2.3 kg), or the limit you are told, until your health care provider says that it is safe.      • Do not drive, ride a bike, or use heavy machinery until your health care provider approves.      •Always wear your seat belt when you are in a motor vehicle.      Alcohol use     • Do not drink alcohol if your health care provider tells you not to drink.    •If you drink alcohol, limit how much you use to:  •0–1 drink a day for women.      •0–2 drinks a day for men.        General instructions     •Monitor your symptoms, and ask people around you to do the same. Recovery from brain injuries varies. Talk with your health care provider about what to expect.      •Take over-the-counter and prescription medicines only as told by your health care provider. Do not take blood thinners or NSAIDs unless your health care provider approves. These include aspirin, ibuprofen, naproxen, and warfarin.      •Keep your home environment safe to reduce the risk of falling.      •Keep all follow-up visits as told by your health care provider. This is important.        Where to find more information    •National [place] of Neurological Disorders and Stroke: www.ninds.nih.gov      •American Academy of Neurology (AAN): www.aan.com      •Brain Injury Association of [place]: www.biausa.org        Get help right away if you:    •Are taking blood thinners and you fall or you experience minor trauma to the head. If you take any blood thinners, even a very small injury can cause a subdural hematoma.      •Have a bleeding disorder and you fall or you experience minor trauma to the head.    •Develop any of the following symptoms after a head injury:  •Clear fluid draining from your nose or ears.      •Nausea or vomiting.      •Changes in speech or trouble understanding what people say.      •Seizures.      •Drowsiness or a decrease in alertness.      •Double vision.      •Numbness or inability to move (paralysis) in any part of your body.      •Difficulty walking or poor coordination.      •Difficulty thinking.      •Confusion or forgetfulness.      •Personality changes.      •Irrational or aggressive behavior.        These symptoms may represent a serious problem that is an emergency. Do not wait to see if the symptoms will go away. Get medical help right away. Call your local emergency services (911 in the [place] Do not drive yourself to the hospital.       Summary    •A subdural hematoma is a collection of blood between the brain and its outer covering (dura).      •Treatment for this condition depends on what type of subdural hematoma you have and how severe it is.      •Symptoms can vary from mild to severe to life-threatening.      •Monitor your symptoms, and ask others around you to do the same.      This information is not intended to replace advice given to you by your health care provider. Make sure you discuss any questions you have with your health care provider.      Document Revised: 03/07/2022 Document Reviewed: 03/07/2022    Elsevier Patient Education © 2022 [employer] EnglishSpanish                                                                                                                                                                                              Subdural Hematoma    Cross-section of the brain with a close-up showing a collection of blood between the brain and its outer covering, or dura.   A subdural hematoma is a collection of blood between the brain and its outer covering (dura). As the amount of blood increases, pressure builds on the brain.    There are two types of subdural hematomas:  •Acute. This type develops shortly after a hard, direct hit to the head and causes blood to collect very quickly. This is a medical emergency. If it is not diagnosed and treated quickly, it can lead to severe brain injury or death.      •Chronic. This is when bleeding develops more slowly, over weeks or months. In some cases, this type does not cause symptoms.        What are the causes?    This condition is caused by bleeding (hemorrhage) from a broken (ruptured) blood vessel. In most cases, a blood vessel ruptures and bleeds because of a head injury, such as from a hard, direct hit. Head injuries can happen in car accidents, falls, assaults, or while playing sports.    In rare cases, a hemorrhage can happen without a known cause (spontaneously), especially if you take blood thinners (anticoagulants).      What increases the risk?    This condition is more likely to develop in:  •Older people.      •Infants.      •People who take blood thinners.      •People who have head injuries.      •People who abuse alcohol.        What are the signs or symptoms?    Symptoms of this condition can vary depending on the size of the hematoma. Symptoms can be mild, severe, or life-threatening. They include:  •Headaches.      •Nausea or vomiting.      •Changes in vision, such as double vision or loss of vision.      •Changes in speech or trouble understanding what people say.      •Loss of balance or trouble walking.      •Weakness, numbness, or tingling in the arms or legs, especially on one side of the body.      •Seizures.      •Change in personality.      •Increased sleepiness.      •Memory loss.      •Loss of consciousness.      •Coma.      Symptoms of acute subdural hematoma can develop over minutes or hours. Symptoms of chronic subdural hematoma may develop over weeks or months.      How is this diagnosed?    This condition is diagnosed based on the results of:  •A physical exam.       •Tests of strength, reflexes, coordination, senses, manner of walking (gait), and facial and eye movements (neurological exam).       •Imaging tests, such as an MRI or a CT scan.        How is this treated?    Treatment for this condition depends on the type of hematoma and how severe it is.    Treatment for acute hematoma may include:  •Emergency surgery to drain blood or remove a blood clot.      •Medicines that help the body get rid of excess fluids (diuretics). These may help to reduce pressure in the brain.      •Assisted breathing (ventilation).      Treatment for chronic hematoma may include:  •Observation and bed rest at the hospital.      •Surgery.      If you take blood thinners, you may need to stop taking them for a short time. You may also be given anti-seizure (anticonvulsant) medicine.    Sometimes, no treatment is needed for chronic subdural hematoma.      Follow these instructions at home:    Activity   •Avoid situations where you could injure your head again, such as in competitive sports, downhill snow sports, and horseback riding. Do not do these activities until your health care provider approves.  •Wear protective gear, such as a helmet, when participating in activities such as biking or contact sports.        •Avoid too much visual stimulation while recovering. This means limiting how much you read and limiting your screen time on a smart phone, tablet, computer, or TV.      •Rest as told by your health care provider. Rest helps the brain heal.      •Try to avoid activities that cause physical or mental stress. Return to work or school as told by your health care provider.      • Do not lift anything that is heavier than 5 lb (2.3 kg), or the limit you are told, until your health care provider says that it is safe.      • Do not drive, ride a bike, or use heavy machinery until your health care provider approves.      •Always wear your seat belt when you are in a motor vehicle.      Alcohol use     • Do not drink alcohol if your health care provider tells you not to drink.    •If you drink alcohol, limit how much you use to:  •0–1 drink a day for women.      •0–2 drinks a day for men.        General instructions     •Monitor your symptoms, and ask people around you to do the same. Recovery from brain injuries varies. Talk with your health care provider about what to expect.      •Take over-the-counter and prescription medicines only as told by your health care provider. Do not take blood thinners or NSAIDs unless your health care provider approves. These include aspirin, ibuprofen, naproxen, and warfarin.      •Keep your home environment safe to reduce the risk of falling.      •Keep all follow-up visits as told by your health care provider. This is important.        Where to find more information    •National [place] of Neurological Disorders and Stroke: www.ninds.nih.gov      •American Academy of Neurology (AAN): www.aan.com      •Brain Injury Association [place]: www.biausa.org        Get help right away if you:    •Are taking blood thinners and you fall or you experience minor trauma to the head. If you take any blood thinners, even a very small injury can cause a subdural hematoma.      •Have a bleeding disorder and you fall or you experience minor trauma to the head.    •Develop any of the following symptoms after a head injury:  •Clear fluid draining from your nose or ears.      •Nausea or vomiting.      •Changes in speech or trouble understanding what people say.      •Seizures.      •Drowsiness or a decrease in alertness.      •Double vision.      •Numbness or inability to move (paralysis) in any part of your body.      •Difficulty walking or poor coordination.      •Difficulty thinking.      •Confusion or forgetfulness.      •Personality changes.      •Irrational or aggressive behavior.        These symptoms may represent a serious problem that is an emergency. Do not wait to see if the symptoms will go away. Get medical help right away. Call your local emergency services (911 in the [place] Do not drive yourself to the hospital.       Summary    •A subdural hematoma is a collection of blood between the brain and its outer covering (dura).      •Treatment for this condition depends on what type of subdural hematoma you have and how severe it is.      •Symptoms can vary from mild to severe to life-threatening.      •Monitor your symptoms, and ask others around you to do the same.      This information is not intended to replace advice given to you by your health care provider. Make sure you discuss any questions you have with your health care provider.      Document Revised: 03/07/2022 Document Reviewed: 03/07/2022    Elsevier Patient Education © 2022 [employer] EnglishSpanish                                                                                                                                                                                              Subdural Hematoma    Cross-section of the brain with a close-up showing a collection of blood between the brain and its outer covering, or dura.   A subdural hematoma is a collection of blood between the brain and its outer covering (dura). As the amount of blood increases, pressure builds on the brain.    There are two types of subdural hematomas:  •Acute. This type develops shortly after a hard, direct hit to the head and causes blood to collect very quickly. This is a medical emergency. If it is not diagnosed and treated quickly, it can lead to severe brain injury or death.      •Chronic. This is when bleeding develops more slowly, over weeks or months. In some cases, this type does not cause symptoms.        What are the causes?    This condition is caused by bleeding (hemorrhage) from a broken (ruptured) blood vessel. In most cases, a blood vessel ruptures and bleeds because of a head injury, such as from a hard, direct hit. Head injuries can happen in car accidents, falls, assaults, or while playing sports.    In rare cases, a hemorrhage can happen without a known cause (spontaneously), especially if you take blood thinners (anticoagulants).      What increases the risk?    This condition is more likely to develop in:  •Older people.      •Infants.      •People who take blood thinners.      •People who have head injuries.      •People who abuse alcohol.        What are the signs or symptoms?    Symptoms of this condition can vary depending on the size of the hematoma. Symptoms can be mild, severe, or life-threatening. They include:  •Headaches.      •Nausea or vomiting.      •Changes in vision, such as double vision or loss of vision.      •Changes in speech or trouble understanding what people say.      •Loss of balance or trouble walking.      •Weakness, numbness, or tingling in the arms or legs, especially on one side of the body.      •Seizures.      •Change in personality.      •Increased sleepiness.      •Memory loss.      •Loss of consciousness.      •Coma.      Symptoms of acute subdural hematoma can develop over minutes or hours. Symptoms of chronic subdural hematoma may develop over weeks or months.      How is this diagnosed?    This condition is diagnosed based on the results of:  •A physical exam.       •Tests of strength, reflexes, coordination, senses, manner of walking (gait), and facial and eye movements (neurological exam).       •Imaging tests, such as an MRI or a CT scan.        How is this treated?    Treatment for this condition depends on the type of hematoma and how severe it is.    Treatment for acute hematoma may include:  •Emergency surgery to drain blood or remove a blood clot.      •Medicines that help the body get rid of excess fluids (diuretics). These may help to reduce pressure in the brain.      •Assisted breathing (ventilation).      Treatment for chronic hematoma may include:  •Observation and bed rest at the hospital.      •Surgery.      If you take blood thinners, you may need to stop taking them for a short time. You may also be given anti-seizure (anticonvulsant) medicine.    Sometimes, no treatment is needed for chronic subdural hematoma.      Follow these instructions at home:    Activity   •Avoid situations where you could injure your head again, such as in competitive sports, downhill snow sports, and horseback riding. Do not do these activities until your health care provider approves.  •Wear protective gear, such as a helmet, when participating in activities such as biking or contact sports.        •Avoid too much visual stimulation while recovering. This means limiting how much you read and limiting your screen time on a smart phone, tablet, computer, or TV.      •Rest as told by your health care provider. Rest helps the brain heal.      •Try to avoid activities that cause physical or mental stress. Return to work or school as told by your health care provider.      • Do not lift anything that is heavier than 5 lb (2.3 kg), or the limit you are told, until your health care provider says that it is safe.      • Do not drive, ride a bike, or use heavy machinery until your health care provider approves.      •Always wear your seat belt when you are in a motor vehicle.      Alcohol use     • Do not drink alcohol if your health care provider tells you not to drink.    •If you drink alcohol, limit how much you use to:  •0–1 drink a day for women.      •0–2 drinks a day for men.        General instructions     •Monitor your symptoms, and ask people around you to do the same. Recovery from brain injuries varies. Talk with your health care provider about what to expect.      •Take over-the-counter and prescription medicines only as told by your health care provider. Do not take blood thinners or NSAIDs unless your health care provider approves. These include aspirin, ibuprofen, naproxen, and warfarin.      •Keep your home environment safe to reduce the risk of falling.      •Keep all follow-up visits as told by your health care provider. This is important.        Where to find more information    •National [place] of Neurological Disorders and Stroke: www.ninds.nih.gov      •American Academy of Neurology (AAN): www.aan.com      •Brain Injury Association of [place]: www.biausa.org        Get help right away if you:    •Are taking blood thinners and you fall or you experience minor trauma to the head. If you take any blood thinners, even a very small injury can cause a subdural hematoma.      •Have a bleeding disorder and you fall or you experience minor trauma to the head.    •Develop any of the following symptoms after a head injury:  •Clear fluid draining from your nose or ears.      •Nausea or vomiting.      •Changes in speech or trouble understanding what people say.      •Seizures.      •Drowsiness or a decrease in alertness.      •Double vision.      •Numbness or inability to move (paralysis) in any part of your body.      •Difficulty walking or poor coordination.      •Difficulty thinking.      •Confusion or forgetfulness.      •Personality changes.      •Irrational or aggressive behavior.        These symptoms may represent a serious problem that is an emergency. Do not wait to see if the symptoms will go away. Get medical help right away. Call your local emergency services (911 in the [place] Do not drive yourself to the hospital.       Summary    •A subdural hematoma is a collection of blood between the brain and its outer covering (dura).      •Treatment for this condition depends on what type of subdural hematoma you have and how severe it is.      •Symptoms can vary from mild to severe to life-threatening.      •Monitor your symptoms, and ask others around you to do the same.      This information is not intended to replace advice given to you by your health care provider. Make sure you discuss any questions you have with your health care provider.      Document Revised: 03/07/2022 Document Reviewed: 03/07/2022    Elsevier Patient Education © 2022 [employer] Inc[employer]

## 2022-12-29 NOTE — ED PROVIDER NOTE - CARE PROVIDER_API CALL
Jose Dodd)  Neurosurgery  [address] [place] [address]  Phone: [phone]  Fax: [phone]  Follow Up Time: 7-10 Days   Jose Dodd)  Neurosurgery  [address] [place] [address]  Phone: [phone]  Fax: [phone]  Follow Up Time: 7-10 Days   Jose Dodd)  Neurosurgery  [address] [place] [address]  Phone: [phone]  Fax: [phone]  Follow Up Time: 7-10 Days

## 2022-12-29 NOTE — ED ADULT NURSE REASSESSMENT NOTE - NS ED NURSE REASSESS COMMENT FT1
IV initiated and labs sent with Covid swab. Pt is AOx4, no AH or blurry vision. Only pain is to upper lip. Pupils equal and reactive.

## 2023-01-02 DIAGNOSIS — S06.5X0A TRAUMATIC SUBDURAL HEMORRHAGE WITHOUT LOSS OF CONSCIOUSNESS, INITIAL ENCOUNTER: ICD-10-CM

## 2023-01-02 DIAGNOSIS — S01.511A LACERATION WITHOUT FOREIGN BODY OF LIP, INITIAL ENCOUNTER: ICD-10-CM

## 2023-01-02 DIAGNOSIS — Z79.01 LONG TERM (CURRENT) USE OF ANTICOAGULANTS: ICD-10-CM

## 2023-01-02 DIAGNOSIS — I48.91 UNSPECIFIED ATRIAL FIBRILLATION: ICD-10-CM

## 2023-01-02 DIAGNOSIS — I50.9 HEART FAILURE, UNSPECIFIED: ICD-10-CM
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
## 2023-01-02 DIAGNOSIS — N40.0 BENIGN PROSTATIC HYPERPLASIA WITHOUT LOWER URINARY TRACT SYMPTOMS: ICD-10-CM

## 2023-01-02 DIAGNOSIS — R60.0 LOCALIZED EDEMA: ICD-10-CM

## 2023-01-02 DIAGNOSIS — I11.0 HYPERTENSIVE HEART DISEASE WITH HEART FAILURE: ICD-10-CM

## 2023-01-02 DIAGNOSIS — U07.1 COVID-19: ICD-10-CM

## 2023-01-11 ENCOUNTER — NON-APPOINTMENT (OUTPATIENT)
Age: 88
End: 2023-01-11

## 2023-01-11 ENCOUNTER — APPOINTMENT (OUTPATIENT)
Dept: CARDIOLOGY | Facility: CLINIC | Age: 88
End: 2023-01-11
Payer: MEDICARE

## 2023-01-11 VITALS
SYSTOLIC BLOOD PRESSURE: 145 MMHG | TEMPERATURE: 97.5 F | DIASTOLIC BLOOD PRESSURE: 113 MMHG | OXYGEN SATURATION: 98 % | HEART RATE: 92 BPM | WEIGHT: 180 LBS | BODY MASS INDEX: 30.9 KG/M2 | RESPIRATION RATE: 18 BRPM

## 2023-01-11 VITALS — SYSTOLIC BLOOD PRESSURE: 151 MMHG | DIASTOLIC BLOOD PRESSURE: 89 MMHG

## 2023-01-11 DIAGNOSIS — R06.02 SHORTNESS OF BREATH: ICD-10-CM

## 2023-01-11 DIAGNOSIS — I51.9 HEART DISEASE, UNSPECIFIED: ICD-10-CM

## 2023-01-11 DIAGNOSIS — I27.20 PULMONARY HYPERTENSION, UNSPECIFIED: ICD-10-CM

## 2023-01-11 PROCEDURE — 93000 ELECTROCARDIOGRAM COMPLETE: CPT | Mod: 59

## 2023-01-11 PROCEDURE — 99214 OFFICE O/P EST MOD 30 MIN: CPT | Mod: 25

## 2023-01-11 RX ORDER — SACUBITRIL AND VALSARTAN 24; 26 MG/1; MG/1
24-26 TABLET, FILM COATED ORAL
Qty: 60 | Refills: 3 | Status: ACTIVE | COMMUNITY
Start: 2023-01-11 | End: 1900-01-01

## 2023-01-11 NOTE — PHYSICAL EXAM
[Well Developed] : well developed [Well Nourished] : well nourished [No Acute Distress] : no acute distress [Normal Conjunctiva] : normal conjunctiva [Normal Venous Pressure] : normal venous pressure [No Carotid Bruit] : no carotid bruit [Normal S1, S2] : normal S1, S2 [No Murmur] : no murmur [No Rub] : no rub [No Gallop] : no gallop [Clear Lung Fields] : clear lung fields [Good Air Entry] : good air entry [No Respiratory Distress] : no respiratory distress  [Soft] : abdomen soft [Non Tender] : non-tender [No Masses/organomegaly] : no masses/organomegaly [Normal Bowel Sounds] : normal bowel sounds [Normal Gait] : normal gait [No Edema] : no edema [No Cyanosis] : no cyanosis [No Clubbing] : no clubbing [No Varicosities] : no varicosities [Normal Radial B/L] : normal radial B/L [No Rash] : no rash [No Skin Lesions] : no skin lesions [Moves all extremities] : moves all extremities [No Focal Deficits] : no focal deficits [Normal Speech] : normal speech [Alert and Oriented] : alert and oriented [Normal memory] : normal memory [de-identified] : s/p thyroidectomy. [de-identified] : reduction of breathing sound consistent with mild emphysema. [de-identified] : operational scar at the epigastric region. [de-identified] : weak while walking. [de-identified] : LE with varicose vein, pedal edema. [de-identified] : Skin at lower extremities with  discoloration.

## 2023-01-11 NOTE — HISTORY OF PRESENT ILLNESS
[FreeTextEntry1] : Patient, an 87 year old  male with recent hospitalization at [facility] for the  urinary disturbance and leg edema, was here for the follow up of cardiac diagnosis with  shortness of breath, edema of legs and atrial fib. His current medication is reviewed and reconciled.\par He has been with medical condition of respiratory difficulty, depression, chronic coughing, urinary problem before entering the hospital for emergency treatment.\par At limited, but ordinary condition, he has no chest pain, but exertional shortness of breath, no dizziness or palpitation.\par With current medication, he has been stable and feeling of well being. He is here with the findings of progressive worsening of the feet edema and discoloration of the  lower extremities. He sits all day long with very limited activities.

## 2023-01-11 NOTE — CARDIOLOGY SUMMARY
[de-identified] : 5- ECG with atrial fib. controlled ventricular rate, no significant STTC. [de-identified] : TTE done in  [facility]( 5-)  pending the evaluation. repeated echo on 10-, result with mild reduction of EF and  pulmonary hypertension (55).

## 2023-01-11 NOTE — DISCUSSION/SUMMARY
[Falls Prevention] : falls prevention counseling [Atrial Fibrillation] : atrial fibrillation [Persistent Atrial Fibrillation] : persistent atrial fibrillation [Controlled Ventricular Response] : controlled ventricular response [Congestive Heart Failure] : congestive heart failure [Diastolic Heart Failure] : diastolic heart failure [Responding to Treatment] : responding to treatment [Echocardiogram] : echocardiogram [Sodium Restriction] : sodium restriction [Stable] : stable [None] : There are no changes in medication management [Patient] : the patient [Family] : the patient's family [Minutes Spent: ___] : for [unfilled] ~Uminutes [With Me] : with me [___ Month(s)] : in [unfilled] month(s) [de-identified] : " with preserved EF" per hospital record. [de-identified] : continue  diuretics, betablocker. adding aldactone [de-identified] : Per the hospital record. [de-identified] :  symptoms improved. [FreeTextEntry1] : The improvement of CHF was discussed. medication is adjusted.\par The  condition of leg edema was discussed. The patient comprehends the condition. It is likely related to the stasis edema as well. Elevation of leg and ambulation are the additional nonmedical therapy. It is advised.

## 2023-01-11 NOTE — REVIEW OF SYSTEMS
[Lower Ext Edema] : lower extremity edema [Negative] : Heme/Lymph [Fever] : no fever [Headache] : no headache [Chills] : no chills [Feeling Fatigued] : not feeling fatigued [Blurry Vision] : no blurred vision [Seeing Double (Diplopia)] : no diplopia [Eye Pain] : no eye pain [Earache] : no earache [Discharge From Ears] : no discharge from the ears [Hearing Loss] : no hearing loss [Mouth Sores] : no mouth sores [Sore Throat] : no sore throat [Sinus Pressure] : no sinus pressure [Tinnitus] : no tinnitus [Vertigo] : no vertigo [SOB] : no shortness of breath [Dyspnea on exertion] : not dyspnea during exertion [Chest Discomfort] : no chest discomfort [Leg Claudication] : no intermittent leg claudication [Palpitations] : no palpitations [Orthopnea] : no orthopnea [PND] : no PND [Syncope] : no syncope [Cough] : no cough [Wheezing] : no wheezing [Coughing Up Blood] : no hemoptysis [Snoring] : no snoring [Abdominal Pain] : no abdominal pain [Nausea] : no nausea [Vomiting] : no vomiting [Heartburn] : no heartburn [Change in Appetite] : no change in appetite [Change In The Stool] : no change in stool [Dysphagia] : no dysphagia [Diarrhea] : diarrhea [Constipation] : no constipation [Blood in stool] : no blood in stoo [Urinary Frequency] : urinary frequency [Hematuria] : no hematuria [Joint Pain] : no joint pain [Joint Swelling] : no joint swelling [Joint Stiffness] : no joint stiffness [Muscle Cramps] : no muscle cramps [Myalgia] : no myalgia [Rash] : no rash [Itching] : no itching [Change In Color Of Skin] : change in skin color [Skin Lesions] : no skin lesions [Telangiectasias] : no telangiectasias [Dizziness] : no dizziness [Tremor] : no tremor was seen [Numbness (Hypoesthesia)] : no numbness [Convulsions] : no convulsions [Tingling (Paresthesia)] : no tingling [Weakness] : no weakness [Limb Weakness (Paresis)] : no limb weakness (Paresis) [Speech Disturbance] : no speech disturbance [Confusion] : no confusion was observed [Memory Lapses Or Loss] : no memory lapses or loss [Depression] : no depression [Anxiety] : no anxiety [Under Stress] : not under stress [Suicidal] : not suicidal [Easy Bleeding] : no tendency for easy bleeding [Swollen Glands] : no swollen glands [Easy Bruising] : no tendency for easy bruising

## 2023-08-28 ENCOUNTER — APPOINTMENT (OUTPATIENT)
Dept: CARDIOLOGY | Facility: CLINIC | Age: 88
End: 2023-08-28
Payer: MEDICARE

## 2023-08-28 ENCOUNTER — NON-APPOINTMENT (OUTPATIENT)
Age: 88
End: 2023-08-28

## 2023-08-28 VITALS
TEMPERATURE: 97.6 F | RESPIRATION RATE: 18 BRPM | HEART RATE: 86 BPM | OXYGEN SATURATION: 96 % | SYSTOLIC BLOOD PRESSURE: 148 MMHG | WEIGHT: 164 LBS | DIASTOLIC BLOOD PRESSURE: 118 MMHG | BODY MASS INDEX: 28.15 KG/M2

## 2023-08-28 DIAGNOSIS — I48.91 UNSPECIFIED ATRIAL FIBRILLATION: ICD-10-CM

## 2023-08-28 DIAGNOSIS — R60.0 LOCALIZED EDEMA: ICD-10-CM

## 2023-08-28 DIAGNOSIS — I50.9 HEART FAILURE, UNSPECIFIED: ICD-10-CM

## 2023-08-28 PROCEDURE — 93000 ELECTROCARDIOGRAM COMPLETE: CPT

## 2023-08-28 PROCEDURE — 99214 OFFICE O/P EST MOD 30 MIN: CPT | Mod: 25

## 2023-08-28 RX ORDER — METOPROLOL SUCCINATE 50 MG/1
50 TABLET, EXTENDED RELEASE ORAL
Qty: 90 | Refills: 1 | Status: ACTIVE | COMMUNITY
Start: 2022-05-24 | End: 1900-01-01

## 2023-08-28 NOTE — REVIEW OF SYSTEMS
[Fever] : no fever [Headache] : no headache [Chills] : no chills [Feeling Fatigued] : not feeling fatigued [Blurry Vision] : no blurred vision [Seeing Double (Diplopia)] : no diplopia [Eye Pain] : no eye pain [Earache] : no earache [Discharge From Ears] : no discharge from the ears [Hearing Loss] : no hearing loss [Mouth Sores] : no mouth sores [Sore Throat] : no sore throat [Sinus Pressure] : no sinus pressure [Tinnitus] : no tinnitus [Vertigo] : no vertigo [SOB] : no shortness of breath [Dyspnea on exertion] : not dyspnea during exertion [Chest Discomfort] : no chest discomfort [Lower Ext Edema] : lower extremity edema [Leg Claudication] : no intermittent leg claudication [Palpitations] : no palpitations [Orthopnea] : no orthopnea [PND] : no PND [Syncope] : no syncope [Cough] : no cough [Wheezing] : no wheezing [Coughing Up Blood] : no hemoptysis [Snoring] : no snoring [Abdominal Pain] : no abdominal pain [Nausea] : no nausea [Vomiting] : no vomiting [Heartburn] : no heartburn [Change in Appetite] : no change in appetite [Change In The Stool] : no change in stool [Dysphagia] : no dysphagia [Diarrhea] : diarrhea [Constipation] : no constipation [Blood in stool] : no blood in stoo [Urinary Frequency] : urinary frequency [Hematuria] : no hematuria [Joint Pain] : no joint pain [Joint Swelling] : no joint swelling [Joint Stiffness] : no joint stiffness [Muscle Cramps] : no muscle cramps [Myalgia] : no myalgia [Rash] : no rash [Itching] : no itching [Change In Color Of Skin] : change in skin color [Skin Lesions] : no skin lesions [Telangiectasias] : no telangiectasias [Dizziness] : no dizziness [Tremor] : no tremor was seen [Numbness (Hypoesthesia)] : no numbness [Convulsions] : no convulsions [Tingling (Paresthesia)] : no tingling [Weakness] : no weakness [Limb Weakness (Paresis)] : no limb weakness (Paresis) [Speech Disturbance] : no speech disturbance [Confusion] : no confusion was observed [Memory Lapses Or Loss] : no memory lapses or loss [Depression] : no depression [Anxiety] : no anxiety [Under Stress] : not under stress [Suicidal] : not suicidal [Easy Bleeding] : no tendency for easy bleeding [Swollen Glands] : no swollen glands [Easy Bruising] : no tendency for easy bruising [Negative] : Heme/Lymph

## 2023-08-28 NOTE — HISTORY OF PRESENT ILLNESS
[FreeTextEntry1] : 87 year old  male with recent hospitalization at [facility] for the  urinary disturbance and leg edema, was here for the follow up of cardiac diagnosis with  shortness of breath, edema of legs and atrial fib. His current medication is reviewed and reconciled. He has been with medical condition of respiratory difficulty, depression, chronic coughing, urinary problem before entering the hospital for emergency treatment. At limited, but ordinary condition, he has no chest pain, but exertional shortness of breath, no dizziness or palpitation. With current medication, he has been stable and feeling of well being. He is here with the findings of progressive worsening of the feet edema and discoloration of the  lower extremities. He sits all day long with very limited activities.

## 2023-08-28 NOTE — CARDIOLOGY SUMMARY
[de-identified] : 5- ECG with atrial fib. controlled ventricular rate, no significant STTC. [de-identified] : TTE done in  [facility]( 5-)  pending the evaluation. repeated echo on 10-, result with mild reduction of EF and  pulmonary hypertension (55).

## 2023-08-28 NOTE — PHYSICAL EXAM
[Well Developed] : well developed [Well Nourished] : well nourished [No Acute Distress] : no acute distress [Normal Conjunctiva] : normal conjunctiva [Normal Venous Pressure] : normal venous pressure [No Carotid Bruit] : no carotid bruit [Normal S1, S2] : normal S1, S2 [No Murmur] : no murmur [No Rub] : no rub [No Gallop] : no gallop [Clear Lung Fields] : clear lung fields [Good Air Entry] : good air entry [No Respiratory Distress] : no respiratory distress  [Soft] : abdomen soft [Non Tender] : non-tender [No Masses/organomegaly] : no masses/organomegaly [Normal Bowel Sounds] : normal bowel sounds [Normal Gait] : normal gait [No Edema] : no edema [No Cyanosis] : no cyanosis [No Clubbing] : no clubbing [No Varicosities] : no varicosities [Normal Radial B/L] : normal radial B/L [No Rash] : no rash [No Skin Lesions] : no skin lesions [Moves all extremities] : moves all extremities [No Focal Deficits] : no focal deficits [Normal Speech] : normal speech [Alert and Oriented] : alert and oriented [Normal memory] : normal memory [de-identified] : s/p thyroidectomy. [de-identified] : reduction of breathing sound consistent with mild emphysema. [de-identified] : operational scar at the epigastric region. [de-identified] : weak while walking. [de-identified] : LE with varicose vein, pedal edema. [de-identified] : Skin at lower extremities with  discoloration.

## 2023-10-08 ENCOUNTER — RX RENEWAL (OUTPATIENT)
Age: 88
End: 2023-10-08

## 2023-12-03 RX ORDER — FUROSEMIDE 40 MG
1 TABLET ORAL
Qty: 0 | Refills: 0 | DISCHARGE

## 2023-12-03 RX ORDER — TAMSULOSIN HYDROCHLORIDE 0.4 MG/1
1 CAPSULE ORAL
Qty: 0 | Refills: 0 | DISCHARGE

## 2023-12-03 RX ORDER — METOPROLOL TARTRATE 50 MG
0 TABLET ORAL
Qty: 0 | Refills: 0 | DISCHARGE

## 2023-12-03 RX ORDER — APIXABAN 2.5 MG/1
1 TABLET, FILM COATED ORAL
Qty: 0 | Refills: 0 | DISCHARGE

## 2023-12-03 RX ORDER — FINASTERIDE 5 MG/1
1 TABLET, FILM COATED ORAL
Qty: 0 | Refills: 0 | DISCHARGE

## 2023-12-24 ENCOUNTER — RX RENEWAL (OUTPATIENT)
Age: 88
End: 2023-12-24

## 2023-12-24 RX ORDER — TAMSULOSIN HYDROCHLORIDE 0.4 MG/1
0.4 CAPSULE ORAL
Qty: 180 | Refills: 0 | Status: ACTIVE | COMMUNITY
Start: 2022-05-20 | End: 1900-01-01

## 2023-12-24 RX ORDER — FINASTERIDE 5 MG/1
5 TABLET, FILM COATED ORAL DAILY
Qty: 90 | Refills: 0 | Status: ACTIVE | COMMUNITY
Start: 2022-05-24 | End: 1900-01-01

## 2024-03-10 ENCOUNTER — RX RENEWAL (OUTPATIENT)
Age: 89
End: 2024-03-10

## 2024-09-30 ENCOUNTER — APPOINTMENT (OUTPATIENT)
Dept: CARDIOLOGY | Facility: CLINIC | Age: 89
End: 2024-09-30

## 2024-10-07 ENCOUNTER — APPOINTMENT (OUTPATIENT)
Dept: CARDIOLOGY | Facility: CLINIC | Age: 89
End: 2024-10-07
Payer: MEDICARE

## 2024-10-07 ENCOUNTER — APPOINTMENT (OUTPATIENT)
Dept: CARDIOLOGY | Facility: CLINIC | Age: 89
End: 2024-10-07

## 2024-10-07 ENCOUNTER — NON-APPOINTMENT (OUTPATIENT)
Age: 89
End: 2024-10-07

## 2024-10-07 VITALS
BODY MASS INDEX: 30.55 KG/M2 | DIASTOLIC BLOOD PRESSURE: 92 MMHG | WEIGHT: 178 LBS | SYSTOLIC BLOOD PRESSURE: 146 MMHG | OXYGEN SATURATION: 94 % | HEART RATE: 85 BPM | RESPIRATION RATE: 16 BRPM

## 2024-10-07 DIAGNOSIS — R60.0 LOCALIZED EDEMA: ICD-10-CM

## 2024-10-07 PROCEDURE — 99214 OFFICE O/P EST MOD 30 MIN: CPT | Mod: 25

## 2024-10-07 PROCEDURE — 93306 TTE W/DOPPLER COMPLETE: CPT

## 2024-10-07 PROCEDURE — 93000 ELECTROCARDIOGRAM COMPLETE: CPT

## 2024-10-15 ENCOUNTER — NON-APPOINTMENT (OUTPATIENT)
Age: 89
End: 2024-10-15

## 2024-10-25 ENCOUNTER — APPOINTMENT (OUTPATIENT)
Dept: UROLOGY | Facility: CLINIC | Age: 89
End: 2024-10-25

## 2024-12-17 ENCOUNTER — APPOINTMENT (OUTPATIENT)
Dept: UROLOGY | Facility: CLINIC | Age: 89
End: 2024-12-17
Payer: MEDICARE

## 2024-12-17 ENCOUNTER — APPOINTMENT (OUTPATIENT)
Dept: CARDIOLOGY | Facility: CLINIC | Age: 89
End: 2024-12-17
Payer: MEDICARE

## 2024-12-17 VITALS
WEIGHT: 150 LBS | OXYGEN SATURATION: 96 % | SYSTOLIC BLOOD PRESSURE: 107 MMHG | HEART RATE: 67 BPM | DIASTOLIC BLOOD PRESSURE: 75 MMHG | BODY MASS INDEX: 25.75 KG/M2 | TEMPERATURE: 97.5 F | RESPIRATION RATE: 18 BRPM

## 2024-12-17 DIAGNOSIS — I50.9 HEART FAILURE, UNSPECIFIED: ICD-10-CM

## 2024-12-17 DIAGNOSIS — R60.0 LOCALIZED EDEMA: ICD-10-CM

## 2024-12-17 DIAGNOSIS — I48.91 UNSPECIFIED ATRIAL FIBRILLATION: ICD-10-CM

## 2024-12-17 PROCEDURE — 51701 INSERT BLADDER CATHETER: CPT

## 2024-12-17 PROCEDURE — 99214 OFFICE O/P EST MOD 30 MIN: CPT

## 2024-12-17 PROCEDURE — 99214 OFFICE O/P EST MOD 30 MIN: CPT | Mod: 25

## 2024-12-17 RX ORDER — POTASSIUM CITRATE 10 MEQ/1
10 MEQ TABLET, EXTENDED RELEASE ORAL DAILY
Refills: 0 | Status: ACTIVE | COMMUNITY
Start: 2024-12-17

## 2024-12-17 RX ORDER — CIPROFLOXACIN HYDROCHLORIDE 500 MG/1
500 TABLET, FILM COATED ORAL
Qty: 1 | Refills: 0 | Status: ACTIVE | COMMUNITY
Start: 2024-12-17

## 2024-12-17 RX ORDER — B-COMPLEX WITH VITAMIN C
TABLET ORAL
Refills: 0 | Status: ACTIVE | COMMUNITY
Start: 2024-12-17

## 2025-01-28 ENCOUNTER — APPOINTMENT (OUTPATIENT)
Dept: UROLOGY | Facility: CLINIC | Age: 89
End: 2025-01-28